# Patient Record
Sex: FEMALE | Race: WHITE | NOT HISPANIC OR LATINO | Employment: FULL TIME | URBAN - NONMETROPOLITAN AREA
[De-identification: names, ages, dates, MRNs, and addresses within clinical notes are randomized per-mention and may not be internally consistent; named-entity substitution may affect disease eponyms.]

---

## 2018-05-22 ENCOUNTER — HOSPITAL ENCOUNTER (EMERGENCY)
Facility: HOSPITAL | Age: 28
Discharge: HOME | End: 2018-05-22
Attending: STUDENT IN AN ORGANIZED HEALTH CARE EDUCATION/TRAINING PROGRAM | Admitting: STUDENT IN AN ORGANIZED HEALTH CARE EDUCATION/TRAINING PROGRAM

## 2018-05-22 VITALS
SYSTOLIC BLOOD PRESSURE: 120 MMHG | BODY MASS INDEX: 41.44 KG/M2 | WEIGHT: 225.2 LBS | DIASTOLIC BLOOD PRESSURE: 79 MMHG | HEART RATE: 79 BPM | TEMPERATURE: 98.1 F | OXYGEN SATURATION: 99 % | RESPIRATION RATE: 19 BRPM | HEIGHT: 62 IN

## 2018-05-22 DIAGNOSIS — M54.50 ACUTE BILATERAL LOW BACK PAIN WITHOUT SCIATICA: ICD-10-CM

## 2018-05-22 DIAGNOSIS — S39.012A LUMBOSACRAL STRAIN, INITIAL ENCOUNTER: Primary | ICD-10-CM

## 2018-05-22 LAB
B-HCG UR QL: NEGATIVE
BACTERIA UR QL AUTO: ABNORMAL /HPF
BILIRUB UR QL STRIP: NEGATIVE
CLARITY UR: ABNORMAL
COLOR UR: YELLOW
GLUCOSE UR STRIP-MCNC: NEGATIVE MG/DL
HGB UR QL STRIP.AUTO: ABNORMAL
HYALINE CASTS UR QL AUTO: ABNORMAL /LPF
KETONES UR QL STRIP: NEGATIVE
LEUKOCYTE ESTERASE UR QL STRIP.AUTO: ABNORMAL
NITRITE UR QL STRIP: NEGATIVE
PH UR STRIP.AUTO: 5.5 [PH] (ref 5–8)
PROT UR QL STRIP: NEGATIVE
RBC # UR: ABNORMAL /HPF
REF LAB TEST METHOD: ABNORMAL
SP GR UR STRIP: 1.02 (ref 1–1.03)
SQUAMOUS #/AREA URNS HPF: ABNORMAL /HPF
UROBILINOGEN UR QL STRIP: ABNORMAL
WBC UR QL AUTO: ABNORMAL /HPF

## 2018-05-22 PROCEDURE — 81001 URINALYSIS AUTO W/SCOPE: CPT | Performed by: PHYSICIAN ASSISTANT

## 2018-05-22 PROCEDURE — 81025 URINE PREGNANCY TEST: CPT | Performed by: PHYSICIAN ASSISTANT

## 2018-05-22 PROCEDURE — 99283 EMERGENCY DEPT VISIT LOW MDM: CPT

## 2018-05-22 RX ORDER — ORPHENADRINE CITRATE 100 MG/1
100 TABLET, EXTENDED RELEASE ORAL 2 TIMES DAILY PRN
Qty: 14 TABLET | Refills: 0 | Status: SHIPPED | OUTPATIENT
Start: 2018-05-22 | End: 2018-12-05

## 2018-05-22 RX ORDER — NAPROXEN 500 MG/1
500 TABLET ORAL 2 TIMES DAILY PRN
Qty: 10 TABLET | Refills: 0 | Status: SHIPPED | OUTPATIENT
Start: 2018-05-22 | End: 2018-12-05

## 2018-05-22 NOTE — ED PROVIDER NOTES
Subjective   28-year-old female presents to emergency department with low back pain since yesterday.  States she has been moving recently more lifting and physical activity than usual.  Yesterday pain began increasing with throbbing and spasms in the low back LS/SI area.  No radiation into the buttocks or lower extremities.  No bowel or bladder dysfunction.  No abdominal pain bloating or distention.  No fevers chills or sweats dysuria hematuria pyuria.  No prior history of chronic back pain or chronic back problems.        History provided by:  Patient  Back Pain   Location:  Lumbar spine and sacro-iliac joint  Quality:  Aching and burning  Radiates to:  Does not radiate  Pain severity:  Moderate  Pain is:  Same all the time  Onset quality:  Gradual  Duration:  2 days  Timing:  Constant  Progression:  Waxing and waning  Chronicity:  New  Context: lifting heavy objects and physical stress    Context: not recent illness, not recent injury and not twisting    Relieved by:  OTC medications, bed rest and heating pad  Worsened by:  Ambulation, bending and movement  Ineffective treatments:  Heating pad, lying down and OTC medications  Associated symptoms: no abdominal pain, no abdominal swelling, no bladder incontinence, no bowel incontinence, no chest pain, no dysuria, no fever, no headaches, no leg pain, no numbness, no paresthesias, no pelvic pain, no perianal numbness, no tingling and no weakness        Review of Systems   Constitutional: Negative for chills and fever.   Respiratory: Negative for shortness of breath.    Cardiovascular: Negative for chest pain.   Gastrointestinal: Negative for abdominal pain, anal bleeding, blood in stool, bowel incontinence, constipation, diarrhea, nausea and vomiting.   Genitourinary: Negative for bladder incontinence, difficulty urinating, dysuria, flank pain, frequency, hematuria, pelvic pain and urgency.   Musculoskeletal: Positive for back pain. Negative for neck stiffness.    Neurological: Negative for dizziness, tingling, seizures, syncope, speech difficulty, weakness, light-headedness, numbness, headaches and paresthesias.   Psychiatric/Behavioral: Negative for confusion.   All other systems reviewed and are negative.      History reviewed. No pertinent past medical history.    No Known Allergies    Past Surgical History:   Procedure Laterality Date   • TONSILLECTOMY     • WISDOM TOOTH EXTRACTION         History reviewed. No pertinent family history.    Social History     Social History   • Marital status: Single     Social History Main Topics   • Smoking status: Never Smoker   • Smokeless tobacco: Never Used   • Alcohol use No   • Drug use: No     Other Topics Concern   • Not on file           Objective   Physical Exam   Constitutional: She is oriented to person, place, and time. She appears well-developed and well-nourished. No distress.   HENT:   Head: Normocephalic and atraumatic.   Right Ear: External ear normal.   Left Ear: External ear normal.   Nose: Nose normal.   Mouth/Throat: Oropharynx is clear and moist. No oropharyngeal exudate.   Eyes: Conjunctivae and EOM are normal. Pupils are equal, round, and reactive to light. Right eye exhibits no discharge. Left eye exhibits no discharge. No scleral icterus.   Neck: Normal range of motion. Neck supple. No JVD present. No tracheal deviation present. No thyromegaly present.   Cardiovascular: Normal rate, regular rhythm, normal heart sounds and intact distal pulses.  Exam reveals no gallop and no friction rub.    No murmur heard.  Pulmonary/Chest: Effort normal. No stridor. No respiratory distress. She has no wheezes. She has no rales.   Abdominal: Soft. She exhibits no distension. There is no tenderness. There is no guarding.   Musculoskeletal: Normal range of motion. She exhibits tenderness. She exhibits no edema or deformity.   Tenderness bilateral SI joints and lower lumbosacral musculature bilaterally.  No true midline  tenderness.  Straight leg raises are negative.  DTRs are 1-2+ over 4+ at the patellae.     Neurological: She is alert and oriented to person, place, and time. No cranial nerve deficit. She exhibits normal muscle tone. Coordination normal.   Skin: Skin is warm and dry. No rash noted. She is not diaphoretic. No erythema. No pallor.   Psychiatric: She has a normal mood and affect. Her behavior is normal. Judgment and thought content normal.   Nursing note and vitals reviewed.      Procedures        Recent Results (from the past 24 hour(s))   Urinalysis With / Culture If Indicated - Urine, Clean Catch    Collection Time: 05/22/18 12:47 PM   Result Value Ref Range    Color, UA Yellow Yellow, Straw    Appearance, UA Slightly Cloudy (A) Clear    pH, UA 5.5 5.0 - 8.0    Specific Gravity, UA 1.020 1.005 - 1.030    Glucose, UA Negative Negative    Ketones, UA Negative Negative    Bilirubin, UA Negative Negative    Blood, UA Small (1+) (A) Negative    Protein, UA Negative Negative    Leuk Esterase, UA Trace (A) Negative    Nitrite, UA Negative Negative    Urobilinogen, UA 0.2 E.U./dL 0.2 - 1.0 E.U./dL   Pregnancy, Urine - Urine, Clean Catch    Collection Time: 05/22/18 12:47 PM   Result Value Ref Range    HCG, Urine QL Negative Negative   Urinalysis, Microscopic Only - Urine, Clean Catch    Collection Time: 05/22/18 12:47 PM   Result Value Ref Range    RBC, UA 0-2 (A) None Seen /HPF    WBC, UA 3-5 (A) None Seen /HPF    Bacteria, UA 1+ (A) None Seen /HPF    Squamous Epithelial Cells, UA 7-12 (A) None Seen, 0-2 /HPF    Hyaline Casts, UA None Seen None Seen /LPF    Methodology Manual Light Microscopy      Note: In addition to lab results from this visit, the labs listed above may include labs taken at another facility or during a different encounter within the last 24 hours. Please correlate lab times with ED admission and discharge times for further clarification of the services performed during this visit.    No orders to  "display     Vitals:    05/22/18 1215   BP: 125/85   BP Location: Right arm   Patient Position: Sitting   Pulse: 94   Resp: 18   Temp: 97.6 °F (36.4 °C)   TempSrc: Oral   SpO2: 98%   Weight: 102 kg (225 lb 3.2 oz)   Height: 157.5 cm (62\")     Medications - No data to display  ECG/EMG Results (last 24 hours)     ** No results found for the last 24 hours. **              ED Course                  MDM      Final diagnoses:   Lumbosacral strain, initial encounter   Acute bilateral low back pain without sciatica            Jason Swartz PA-C  05/22/18 4777    "

## 2018-05-22 NOTE — DISCHARGE INSTRUCTIONS
Rest, use warm compresses sparingly 2-3 times a day for 5-10 minutes.  Follow-up with one of the providers listed below to establish a primary care provider and recheck later this week.  Return to the emergency department immediately if any change or worsening of symptoms.

## 2020-03-14 ENCOUNTER — APPOINTMENT (OUTPATIENT)
Dept: GENERAL RADIOLOGY | Facility: HOSPITAL | Age: 30
End: 2020-03-14

## 2020-03-14 ENCOUNTER — HOSPITAL ENCOUNTER (EMERGENCY)
Facility: HOSPITAL | Age: 30
Discharge: HOME | End: 2020-03-14
Attending: EMERGENCY MEDICINE | Admitting: EMERGENCY MEDICINE

## 2020-03-14 VITALS
OXYGEN SATURATION: 98 % | BODY MASS INDEX: 40.48 KG/M2 | HEART RATE: 109 BPM | TEMPERATURE: 101.5 F | RESPIRATION RATE: 16 BRPM | WEIGHT: 220 LBS | SYSTOLIC BLOOD PRESSURE: 110 MMHG | HEIGHT: 62 IN | DIASTOLIC BLOOD PRESSURE: 70 MMHG

## 2020-03-14 DIAGNOSIS — J10.1 INFLUENZA B: Primary | ICD-10-CM

## 2020-03-14 LAB
FLUAV AG NPH QL: NEGATIVE
FLUBV AG NPH QL IA: POSITIVE

## 2020-03-14 PROCEDURE — 71046 X-RAY EXAM CHEST 2 VIEWS: CPT

## 2020-03-14 PROCEDURE — 87804 INFLUENZA ASSAY W/OPTIC: CPT | Performed by: PHYSICIAN ASSISTANT

## 2020-03-14 PROCEDURE — 99283 EMERGENCY DEPT VISIT LOW MDM: CPT

## 2020-03-14 RX ORDER — ACETAMINOPHEN 325 MG/1
650 TABLET ORAL ONCE
Status: COMPLETED | OUTPATIENT
Start: 2020-03-14 | End: 2020-03-14

## 2020-03-14 RX ORDER — ONDANSETRON 4 MG/1
4 TABLET, ORALLY DISINTEGRATING ORAL EVERY 8 HOURS PRN
Qty: 12 TABLET | Refills: 0 | Status: SHIPPED | OUTPATIENT
Start: 2020-03-14 | End: 2021-11-03

## 2020-03-14 RX ADMIN — ACETAMINOPHEN 650 MG: 325 TABLET, FILM COATED ORAL at 20:07

## 2020-03-14 NOTE — ED PROVIDER NOTES
Subjective   30-year-old female presents with a fever and sore throat, she is also had a cough and congestion.  She was recently diagnosed with strep several days ago, and is currently on amoxicillin.  Continues to have breakthrough fever while on amoxicillin.      History provided by:  Patient   used: No        Review of Systems   Constitutional: Positive for fever.   HENT: Positive for congestion and sore throat.    Respiratory: Positive for cough.    All other systems reviewed and are negative.      History reviewed. No pertinent past medical history.    No Known Allergies    Past Surgical History:   Procedure Laterality Date   • TONSILLECTOMY     • WISDOM TOOTH EXTRACTION         Family History   Problem Relation Age of Onset   • No Known Problems Mother    • No Known Problems Father        Social History     Socioeconomic History   • Marital status: Single     Spouse name: Not on file   • Number of children: Not on file   • Years of education: Not on file   • Highest education level: Not on file   Tobacco Use   • Smoking status: Never Smoker   • Smokeless tobacco: Never Used   Substance and Sexual Activity   • Alcohol use: No   • Drug use: No           Objective   Physical Exam   Constitutional: She is oriented to person, place, and time. She appears well-developed and well-nourished.   HENT:   Head: Normocephalic and atraumatic.   Eyes: EOM are normal.   Neck: Normal range of motion. Neck supple.   Cardiovascular: Normal rate and regular rhythm.   Pulmonary/Chest: Effort normal and breath sounds normal.   Abdominal: Soft. Bowel sounds are normal.   Musculoskeletal: Normal range of motion.   Neurological: She is alert and oriented to person, place, and time. She has normal reflexes.   Skin: Skin is warm and dry.   Psychiatric: She has a normal mood and affect.   Nursing note and vitals reviewed.      Procedures           ED Course                                           MDM  Number of  Diagnoses or Management Options  Influenza B: new and requires workup     Amount and/or Complexity of Data Reviewed  Clinical lab tests: reviewed  Tests in the radiology section of CPT®: reviewed  Independent visualization of images, tracings, or specimens: yes    Risk of Complications, Morbidity, and/or Mortality  Presenting problems: minimal  Diagnostic procedures: minimal  Management options: minimal    Patient Progress  Patient progress: stable      Final diagnoses:   Influenza B            Osvaldo Yin Jr., JOSSY  03/14/20 2030

## 2021-11-03 ENCOUNTER — OFFICE VISIT (OUTPATIENT)
Dept: PSYCHIATRY | Facility: CLINIC | Age: 31
End: 2021-11-03

## 2021-11-03 VITALS
SYSTOLIC BLOOD PRESSURE: 130 MMHG | HEIGHT: 62 IN | BODY MASS INDEX: 46.38 KG/M2 | DIASTOLIC BLOOD PRESSURE: 78 MMHG | WEIGHT: 252 LBS | HEART RATE: 70 BPM

## 2021-11-03 DIAGNOSIS — F41.1 GAD (GENERALIZED ANXIETY DISORDER): ICD-10-CM

## 2021-11-03 DIAGNOSIS — F32.4 MAJOR DEPRESSIVE DISORDER WITH SINGLE EPISODE, IN PARTIAL REMISSION (HCC): Primary | ICD-10-CM

## 2021-11-03 DIAGNOSIS — R41.840 POOR CONCENTRATION: ICD-10-CM

## 2021-11-03 PROCEDURE — 90792 PSYCH DIAG EVAL W/MED SRVCS: CPT | Performed by: NURSE PRACTITIONER

## 2021-11-03 RX ORDER — HYDROXYZINE HYDROCHLORIDE 25 MG/1
TABLET, FILM COATED ORAL
COMMUNITY
Start: 2021-10-12 | End: 2021-12-01

## 2021-11-03 RX ORDER — BUSPIRONE HYDROCHLORIDE 7.5 MG/1
TABLET ORAL
COMMUNITY
Start: 2021-09-08 | End: 2022-01-05 | Stop reason: SDUPTHER

## 2021-11-03 RX ORDER — SERTRALINE HYDROCHLORIDE 100 MG/1
TABLET, FILM COATED ORAL
COMMUNITY
Start: 2021-10-12 | End: 2022-01-05 | Stop reason: SDUPTHER

## 2021-11-03 RX ORDER — MULTIPLE VITAMINS W/ MINERALS TAB 9MG-400MCG
TAB ORAL
COMMUNITY
End: 2022-07-12

## 2021-11-03 RX ORDER — PROPRANOLOL HYDROCHLORIDE 10 MG/1
TABLET ORAL
COMMUNITY
Start: 2021-09-22 | End: 2022-01-05 | Stop reason: SDUPTHER
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
## 2021-11-09 ENCOUNTER — TELEPHONE (OUTPATIENT)
Dept: PSYCHIATRY | Facility: CLINIC | Age: 31
End: 2021-11-09

## 2021-11-09 NOTE — TELEPHONE ENCOUNTER
I reviewed her results and there is a moderate likelihood of having a disorder characterized by symptoms of inattentiveness. She has a psychiatric NP so she wanted to discuss medication options upon her return in 4 weeks.

## 2021-11-09 NOTE — TELEPHONE ENCOUNTER
SARAH CALLED TO LET YOU KNOW THAT SHE WENT TO [place] AND COMPLETED HER CPT TEST. LOOKS LIKE IT WAS SCANNED INTO MEDIA TODAY.

## 2021-11-11 ENCOUNTER — TELEPHONE (OUTPATIENT)
Dept: PSYCHIATRY | Facility: CLINIC | Age: 31
End: 2021-11-11

## 2021-11-11 NOTE — TELEPHONE ENCOUNTER
Jeanne called in to advise you that she has completed the CPT testing in [place], she wanted to know results, or move her apt up if possible. Please advise.

## 2021-12-01 ENCOUNTER — LAB (OUTPATIENT)
Dept: LAB | Facility: HOSPITAL | Age: 31
End: 2021-12-01

## 2021-12-01 ENCOUNTER — OFFICE VISIT (OUTPATIENT)
Dept: PSYCHIATRY | Facility: CLINIC | Age: 31
End: 2021-12-01

## 2021-12-01 VITALS
BODY MASS INDEX: 46.38 KG/M2 | HEART RATE: 80 BPM | SYSTOLIC BLOOD PRESSURE: 130 MMHG | WEIGHT: 252 LBS | HEIGHT: 62 IN | DIASTOLIC BLOOD PRESSURE: 74 MMHG

## 2021-12-01 DIAGNOSIS — F90.0 ADHD (ATTENTION DEFICIT HYPERACTIVITY DISORDER), INATTENTIVE TYPE: Primary | ICD-10-CM

## 2021-12-01 DIAGNOSIS — G47.09 OTHER INSOMNIA: Primary | ICD-10-CM

## 2021-12-01 DIAGNOSIS — Z79.899 MEDICATION MANAGEMENT: ICD-10-CM

## 2021-12-01 LAB
AMPHET+METHAMPHET UR QL: NEGATIVE
AMPHETAMINES UR QL: NEGATIVE
BARBITURATES UR QL SCN: NEGATIVE
BENZODIAZ UR QL SCN: NEGATIVE
BUPRENORPHINE SERPL-MCNC: NEGATIVE NG/ML
CANNABINOIDS SERPL QL: POSITIVE
COCAINE UR QL: NEGATIVE
METHADONE UR QL SCN: NEGATIVE
OPIATES UR QL: NEGATIVE
OXYCODONE UR QL SCN: NEGATIVE
PCP UR QL SCN: NEGATIVE
PROPOXYPH UR QL: NEGATIVE
TRICYCLICS UR QL SCN: NEGATIVE

## 2021-12-01 PROCEDURE — 99214 OFFICE O/P EST MOD 30 MIN: CPT | Performed by: NURSE PRACTITIONER

## 2021-12-01 PROCEDURE — G0480 DRUG TEST DEF 1-7 CLASSES: HCPCS

## 2021-12-01 PROCEDURE — 80306 DRUG TEST PRSMV INSTRMNT: CPT

## 2021-12-01 RX ORDER — DEXTROAMPHETAMINE SACCHARATE, AMPHETAMINE ASPARTATE, DEXTROAMPHETAMINE SULFATE AND AMPHETAMINE SULFATE 2.5; 2.5; 2.5; 2.5 MG/1; MG/1; MG/1; MG/1
TABLET ORAL
Qty: 49 TABLET | Refills: 0 | Status: SHIPPED | OUTPATIENT
Start: 2021-12-01 | End: 2021-12-29

## 2021-12-01 RX ORDER — TRAZODONE HYDROCHLORIDE 50 MG/1
50 TABLET ORAL NIGHTLY
Qty: 30 TABLET | Refills: 2 | Status: SHIPPED | OUTPATIENT
Start: 2021-12-01 | End: 2022-01-05

## 2021-12-01 NOTE — PROGRESS NOTES
"Chief Complaint  Poor concentration, depression, and anxiety      Subjective          Jeanne Jack presents to [facility] by herself for a follow up and medication check.    History of Present Illness: Jeanne states, \"I am good.\" Jeanne tells me that \"nothing has changed\" since her last visit. She is working and going to school. Her children are doing well. She completed a CPT-3 test from the [facility] in [place], [place] and is here to discuss results. She continues to struggle with symptoms of focus and concentration.  She reports being compliant with her psychiatric medications and endorses moderate symptoms of depression such as anhedonia, difficulty with sleep, fatigue, increased appetite, feelings of guilt, decreased concentration, and psychomotor retardation.  She adamantly denies any intent or plan for suicide.  She also reports symptoms of anxiety such as feeling nervous, difficulty controlling her worry, worrying too much about different things, difficulty relaxing, being restless, and being easily annoyed and irritable.  She tells me it takes about an hour for her to fall asleep nightly and is up between 2 and 3 times.  She is reports snoring.  She is taking BuSpar, hydroxyzine, propranolol, and Zoloft.  She denies any side effects from current medication regiment.  She denies any problems with SI/HI/AVH.    Current Medications:   Current Outpatient Medications   Medication Sig Dispense Refill   • busPIRone (BUSPAR) 7.5 MG tablet      • multivitamin with minerals (MULTIVITAMIN ADULT PO) multivitamin     • propranolol (INDERAL) 10 MG tablet      • sertraline (ZOLOFT) 100 MG tablet      • amphetamine-dextroamphetamine (Adderall) 10 MG tablet Take 1 tablet by mouth Daily for 7 days, THEN 1 tablet 2 (Two) Times a Day for 21 days. 49 tablet 0   • traZODone (DESYREL) 50 MG tablet Take 1 tablet by mouth Every Night. 30 tablet 2     No current facility-administered " "medications for this visit.         Objective   Vital Signs:   /74   Pulse 80   Ht 157.5 cm (62\")   Wt 114 kg (252 lb)   BMI 46.09 kg/m²     Physical Exam  Vitals and nursing note reviewed.   Constitutional:       Appearance: Normal appearance. She is well-developed.   Musculoskeletal:         General: Normal range of motion.   Skin:     General: Skin is warm and dry.   Neurological:      Mental Status: She is alert and oriented to person, place, and time.   Psychiatric:         Attention and Perception: Attention normal.         Mood and Affect: Mood normal.         Speech: Speech normal.         Behavior: Behavior normal. Behavior is cooperative.         Thought Content: Thought content normal.         Cognition and Memory: Cognition normal.         Judgment: Judgment normal.        Result Review :     The following data was reviewed by: ABILIO Dixon on 12/01/2021:    BEHAVIORAL HEALTH - SCAN - [facility] (10/19/2021)    BEHAVIORAL HEALTH - SCAN - [facility] (11/09/2021)       Assessment and Plan    Problem List Items Addressed This Visit     None      Visit Diagnoses     Other insomnia    -  Primary    Relevant Medications    traZODone (DESYREL) 50 MG tablet    Medication management        Relevant Orders    Urine Drug Screen - Urine, Clean Catch (Completed)          Mental Status Exam:   Hygiene:   good  Cooperation:  Cooperative  Eye Contact:  Good  Psychomotor Behavior:  Appropriate  Affect:  Appropriate  Mood: normal  Speech:  Normal  Thought Process:  Goal directed and Linear  Thought Content:  Normal  Suicidal:  None  Homicidal:  None  Hallucinations:  None  Delusion:  None  Memory:  Intact  Orientation:  Person, Place, Time and Situation  Reliability:  good  Insight:  Fair  Judgement:  Fair  Impulse Control:  Fair  Physical/Medical Issues:  Yes Obesity per BMI      PHQ-9 Score:   PHQ-9 Total Score: 14    Impression/Plan:  -This is an initial evaluation of the patient. Jeanne reports " "ongoing symptoms of depression and anxiety.  She feels nothing has changed since her last appointment.  She obtained a CPT 3 test from the [facility].  It indicates a moderate likelihood of having a disorder characterized by symptoms of inattentiveness.  Jeanne would like to pursue ADHD treatment as she is struggling at both work and home.  We discussed potential options including controlled substances such as Adderall versus Ritalin.  Her son takes Adderall and she feels this may be appropriate for her as well.  Jeanne signed a controlled substance agreement and submitted a urine drug screen.  She was positive for THC and I had a conversation with Jeanne about marijuana use.  She reports using this \"once in a while.\"  She understands that she must abstain from all illicit substances in order to have a controlled substance.  We also discussed adding trazodone to her regiment for help with sleep.  I encouraged Jeanne to have a sleep study due to history of poor sleep and snoring.  She agrees to this.  -Initiate trazodone 50 mg nightly for insomnia.  I explained the purpose of this medication to Jeanne.  We discussed the risk versus benefits of adding this medication to her regiment, as well as potential side effects.  She verbalized understanding.  -Initiate Adderall 10 mg daily for 1 week then increase to 10 mg twice daily for ADHD symptoms.  I explained the purpose of this medication to Jeanne.  We discussed the risk versus benefits of adding this medication to her regiment, as well as potential side effects.  She verbalized understanding.  -The patient is being prescribed a controlled substance as part of the treatment plan. The patient/guardian has been educated of appropriate use of the medication(s), including risks and side effects such as insomnia, headache, exacerbation of tics, nervousness, irritability, overstimulation, tremor, dizziness, anorexia or change in appetite, nausea, dry mouth, constipation, " diarrhea, weight loss, sexual dysfunction, psychotic episodes, seizures, palpitations, tachycardia, hypertension, rare activation (activation of hypomania, mouna, and/or suicidal ideations), cardiovascular adverse effects including sudden death (especially patients with pre-existing structural abnormalities often associated with a family history of cardiac disease), may slow normal growth in children, weight gain is reported but not expected, sedation is possible but activation is much more common, metabolic adversities, and overdose among others. Patient/guardian is also informed that the medication is to be used by the patient only, the medication is to be used only as directed, and the medication should not be combined with other substances unless directed by a Provider/Prescriber. The patient/guardian verbalized understanding and agreement with this in their own words, and wish to continue with current treatment plan.  -Continue Zoloft 2 mg nightly for depression and anxiety.  -Continue propranolol 10 mg twice times daily as needed for anxiety.   -Continue BuSpar 7.5 mg twice daily for anxiety. Patient has refills.  -Continue hydroxyzine 25 mg 3 times daily as needed for anxiety. Patient has refills.  -I will collect a urine drug screen and review the official results.  I am expecting her to be negative for all substances.  -The patient has read and signed the [facility] Controlled Substance Contract. We discussed the risks and benefits of the use of controlled substances, including the risk of tolerance and drug dependence. Anorectic medications can be prescribed by one provider at a time and dispensed from one facility at a time, they can only be taken as prescribed, and we are not obligated to refill them if lost or stolen. The refills are only during regular clinic hours. Jonathan report was pulled on patient, reviewed and found to be appropriate.   -Consult neurology for sleep study.    MEDS ORDERED  DURING VISIT:  New Medications Ordered This Visit   Medications   • traZODone (DESYREL) 50 MG tablet     Sig: Take 1 tablet by mouth Every Night.     Dispense:  30 tablet     Refill:  2       I spent 30 minutes caring for Jeanne on this date of service. This time includes time spent by me in the following activities:preparing for the visit, reviewing tests, performing a medically appropriate examination and/or evaluation , counseling and educating the patient/family/caregiver, ordering medications, tests, or procedures, documenting information in the medical record and care coordination  Follow Up   Return in about 4 weeks (around 12/29/2021) for Medication Check.  Patient was given instructions and counseling regarding her condition or for health maintenance advice. Please see specific information pulled into the AVS if appropriate.       TREATMENT PLAN/GOALS: Continue supportive psychotherapy efforts and medications as indicated. Treatment and medication options discussed during today's visit. Patient acknowledged and verbally consented to continue with current treatment plan and was educated on the importance of compliance with treatment and follow-up appointments.    MEDICATION ISSUES:  Discussed medication options and treatment plan of prescribed medication as well as the risks, benefits, and side effects including potential falls, possible impaired driving and metabolic adversities among others. Patient is agreeable to call the office with any worsening of symptoms or onset of side effects. Patient is agreeable to call 911 or go to the nearest ER should he/she begin having SI/HI.        This document has been electronically signed by ABILIO Ocasio, PMHNP-BC  December 1, 2021 16:40 EST    Part of this note may be an electronic transcription/translation of spoken language to printed text using the Dragon Dictation System.

## 2021-12-15 LAB — CANNABINOIDS UR QL CFM: POSITIVE

## 2022-01-05 ENCOUNTER — OFFICE VISIT (OUTPATIENT)
Dept: PSYCHIATRY | Facility: CLINIC | Age: 32
End: 2022-01-05

## 2022-01-05 VITALS
HEIGHT: 62 IN | SYSTOLIC BLOOD PRESSURE: 122 MMHG | WEIGHT: 256 LBS | BODY MASS INDEX: 47.11 KG/M2 | DIASTOLIC BLOOD PRESSURE: 86 MMHG | HEART RATE: 96 BPM

## 2022-01-05 DIAGNOSIS — F41.1 GAD (GENERALIZED ANXIETY DISORDER): ICD-10-CM

## 2022-01-05 DIAGNOSIS — G47.09 OTHER INSOMNIA: ICD-10-CM

## 2022-01-05 DIAGNOSIS — F32.4 MAJOR DEPRESSIVE DISORDER WITH SINGLE EPISODE, IN PARTIAL REMISSION: ICD-10-CM

## 2022-01-05 DIAGNOSIS — F90.0 ADHD (ATTENTION DEFICIT HYPERACTIVITY DISORDER), INATTENTIVE TYPE: Primary | ICD-10-CM

## 2022-01-05 PROCEDURE — 99213 OFFICE O/P EST LOW 20 MIN: CPT | Performed by: NURSE PRACTITIONER

## 2022-01-05 RX ORDER — DEXTROAMPHETAMINE SACCHARATE, AMPHETAMINE ASPARTATE, DEXTROAMPHETAMINE SULFATE AND AMPHETAMINE SULFATE 2.5; 2.5; 2.5; 2.5 MG/1; MG/1; MG/1; MG/1
10 TABLET ORAL 2 TIMES DAILY
COMMUNITY
End: 2022-01-05

## 2022-01-05 RX ORDER — SERTRALINE HYDROCHLORIDE 100 MG/1
100 TABLET, FILM COATED ORAL NIGHTLY
Qty: 30 TABLET | Refills: 2 | Status: SHIPPED | OUTPATIENT
Start: 2022-01-05 | End: 2022-03-08

## 2022-01-05 RX ORDER — PROPRANOLOL HYDROCHLORIDE 10 MG/1
10 TABLET ORAL 2 TIMES DAILY PRN
Qty: 60 TABLET | Refills: 2 | Status: SHIPPED | OUTPATIENT
Start: 2022-01-05 | End: 2022-05-09 | Stop reason: SDUPTHER

## 2022-01-05 RX ORDER — DEXTROAMPHETAMINE SACCHARATE, AMPHETAMINE ASPARTATE MONOHYDRATE, DEXTROAMPHETAMINE SULFATE AND AMPHETAMINE SULFATE 3.75; 3.75; 3.75; 3.75 MG/1; MG/1; MG/1; MG/1
15 CAPSULE, EXTENDED RELEASE ORAL EVERY MORNING
Qty: 30 CAPSULE | Refills: 0 | Status: SHIPPED | OUTPATIENT
Start: 2022-01-05 | End: 2022-03-08 | Stop reason: SDUPTHER

## 2022-01-05 RX ORDER — BUSPIRONE HYDROCHLORIDE 7.5 MG/1
7.5 TABLET ORAL 2 TIMES DAILY
Qty: 60 TABLET | Refills: 2 | Status: SHIPPED | OUTPATIENT
Start: 2022-01-05 | End: 2022-05-09 | Stop reason: SDUPTHER

## 2022-01-05 RX ORDER — TRAZODONE HYDROCHLORIDE 100 MG/1
100 TABLET ORAL NIGHTLY
Qty: 30 TABLET | Refills: 1 | Status: SHIPPED | OUTPATIENT
Start: 2022-01-05 | End: 2022-03-08 | Stop reason: SDUPTHER

## 2022-01-05 NOTE — PROGRESS NOTES
"Chief Complaint  Depression, anxiety, and ADHD      Subjective          Jeanne Jack presents to [facility] by herself for a follow up and medication check.    History of Present Illness: Jeanne states, \"I am good.\" Jeanne tells me that Trazodone has not been beneficial for her sleep.  She tells me that she takes her medicine around 10 PM nightly and is still taking 45 minutes to 1 hour to initiate sleep.  She tells me that she wakes to go to the bathroom or at random times and it is difficult to resume sleep.  She is up for good around 6:30 AM.  She reports mild depressive symptoms such as anhedonia, difficulty with sleep, fatigue, increased appetite after her medications wear off, decreased concentration, and psychomotor retardation.  She reports having ongoing restlessness and difficulty relaxing.  She tells me the Adderall has been beneficial to help her focus and she is less distractible.  She also reports having more energy during the day.  She has noticed a \"crash\" in the evening with this medication.  She is no longer using marijuana.  She is compliant with her current medication regiment.  She is taking BuSpar, propranolol, Zoloft, trazodone, and Adderall.    Current Medications:   Current Outpatient Medications   Medication Sig Dispense Refill   • busPIRone (BUSPAR) 7.5 MG tablet Take 1 tablet by mouth 2 (Two) Times a Day. 60 tablet 2   • multivitamin with minerals (MULTIVITAMIN ADULT PO) multivitamin     • propranolol (INDERAL) 10 MG tablet Take 1 tablet by mouth 2 (Two) Times a Day As Needed (anxiety). 60 tablet 2   • sertraline (ZOLOFT) 100 MG tablet Take 1 tablet by mouth Every Night. 30 tablet 2   • traZODone (DESYREL) 100 MG tablet Take 1 tablet by mouth Every Night. 30 tablet 1   • amphetamine-dextroamphetamine XR (Adderall XR) 15 MG 24 hr capsule Take 1 capsule by mouth Every Morning 30 capsule 0     No current facility-administered medications for this visit. " "        Objective   Vital Signs:   /86   Pulse 96   Ht 157.5 cm (62\")   Wt 116 kg (256 lb)   BMI 46.82 kg/m²     Physical Exam  Vitals and nursing note reviewed.   Constitutional:       Appearance: Normal appearance. She is well-developed. She is obese.   Musculoskeletal:         General: Normal range of motion.   Skin:     General: Skin is warm and dry.   Neurological:      Mental Status: She is alert and oriented to person, place, and time.   Psychiatric:         Attention and Perception: Attention normal.         Mood and Affect: Mood normal.         Speech: Speech normal.         Behavior: Behavior normal. Behavior is cooperative.         Thought Content: Thought content normal.         Cognition and Memory: Cognition normal.         Judgment: Judgment normal.        Result Review :                   Assessment and Plan    Problem List Items Addressed This Visit     None      Visit Diagnoses     ADHD (attention deficit hyperactivity disorder), inattentive type    -  Primary    Relevant Medications    traZODone (DESYREL) 100 MG tablet    busPIRone (BUSPAR) 7.5 MG tablet    sertraline (ZOLOFT) 100 MG tablet    amphetamine-dextroamphetamine XR (Adderall XR) 15 MG 24 hr capsule    Other insomnia        Relevant Medications    traZODone (DESYREL) 100 MG tablet    Major depressive disorder with single episode, in partial remission (HCC)        Relevant Medications    traZODone (DESYREL) 100 MG tablet    busPIRone (BUSPAR) 7.5 MG tablet    sertraline (ZOLOFT) 100 MG tablet    amphetamine-dextroamphetamine XR (Adderall XR) 15 MG 24 hr capsule    ERA (generalized anxiety disorder)        Relevant Medications    traZODone (DESYREL) 100 MG tablet    busPIRone (BUSPAR) 7.5 MG tablet    sertraline (ZOLOFT) 100 MG tablet    propranolol (INDERAL) 10 MG tablet    amphetamine-dextroamphetamine XR (Adderall XR) 15 MG 24 hr capsule          Mental Status Exam:   Hygiene:   good  Cooperation:  Cooperative  Eye Contact:  " "Good  Psychomotor Behavior:  Appropriate  Affect:  Appropriate  Mood: normal  Speech:  Normal  Thought Process:  Goal directed and Linear  Thought Content:  Normal  Suicidal:  None  Homicidal:  None  Hallucinations:  None  Delusion:  None  Memory:  Intact  Orientation:  Person, Place, Time and Situation  Reliability:  good  Insight:  Good  Judgement:  Good  Impulse Control:  Good  Physical/Medical Issues:  Yes Obesity per BMI       PHQ-9 Score:   PHQ-9 Total Score: 9    Impression/Plan:  -This is a follow up and medication check.  Jeanne reports improved symptoms of depression and anxiety.  She also reports improved focus, concentration, and attention.  She does have a notable \"crash\" when her medication wears off in the evening which has increased her appetite.  She reports having some ongoing restlessness and difficulty relaxing.  She does not feel the trazodone has been very beneficial for her sleep.  We discussed continuing to make medication adjustments to improve her symptoms including increasing trazodone to assess for further benefits and changing Adderall to XR.  She is in agreement to these changes.  -Increase trazodone to 100 mg nightly for insomnia.    -Change Adderall to XR 15 mg daily for ADHD symptoms.    The patient is aware she can contact this provider if the 15 mg dose is not as beneficial for her symptom management.  At that time, we will add a 5 mg XR dose to take in addition to the 15 mg.  -Continue BuSpar 7.5 mg twice daily for anxiety.  -Continue propranolol 10 mg twice daily as needed for anxiety.  -Continue Zoloft 100 mg nightly for depression and anxiety.  -The [facility] report, reviewed through PDMP, of the past 12 months were reviewed and is appropriate.  The patient/guardian reports taking the medication only as prescribed.  The patient/guardian denies any abuse or misuse of the medication.  The patient/guardian denies any other substance use or issues.  There are no apparent substance " related issues.  The patient reports no side effects of the current medication usage.  The patient/guardian has reported significant improvement with medication usage and wishes to continue medication as prescribed.  The patient/guardian is appropriate to continue with current medication usage at this time.  Reinforced risks and side effects of medication usage, patient and/or guardian verbalize understanding in their own words and are in agreement with current plan.    MEDS ORDERED DURING VISIT:  New Medications Ordered This Visit   Medications   • traZODone (DESYREL) 100 MG tablet     Sig: Take 1 tablet by mouth Every Night.     Dispense:  30 tablet     Refill:  1   • busPIRone (BUSPAR) 7.5 MG tablet     Sig: Take 1 tablet by mouth 2 (Two) Times a Day.     Dispense:  60 tablet     Refill:  2   • sertraline (ZOLOFT) 100 MG tablet     Sig: Take 1 tablet by mouth Every Night.     Dispense:  30 tablet     Refill:  2   • propranolol (INDERAL) 10 MG tablet     Sig: Take 1 tablet by mouth 2 (Two) Times a Day As Needed (anxiety).     Dispense:  60 tablet     Refill:  2   • amphetamine-dextroamphetamine XR (Adderall XR) 15 MG 24 hr capsule     Sig: Take 1 capsule by mouth Every Morning     Dispense:  30 capsule     Refill:  0       I spent 21 minutes caring for Jeanne on this date of service. This time includes time spent by me in the following activities:preparing for the visit, performing a medically appropriate examination and/or evaluation , counseling and educating the patient/family/caregiver, ordering medications, tests, or procedures and documenting information in the medical record  Follow Up   Return in about 2 months (around 3/5/2022) for Medication Check.  Patient was given instructions and counseling regarding her condition or for health maintenance advice. Please see specific information pulled into the AVS if appropriate.       TREATMENT PLAN/GOALS: Continue supportive psychotherapy efforts and medications  as indicated. Treatment and medication options discussed during today's visit. Patient acknowledged and verbally consented to continue with current treatment plan and was educated on the importance of compliance with treatment and follow-up appointments.    MEDICATION ISSUES:  Discussed medication options and treatment plan of prescribed medication as well as the risks, benefits, and side effects including potential falls, possible impaired driving and metabolic adversities among others. Patient is agreeable to call the office with any worsening of symptoms or onset of side effects. Patient is agreeable to call 911 or go to the nearest ER should he/she begin having SI/HI.        This document has been electronically signed by ABILIO Ocasio, PMHNP-BC  January 5, 2022 18:10 EST    Part of this note may be an electronic transcription/translation of spoken language to printed text using the Dragon Dictation System.

## 2022-03-08 ENCOUNTER — OFFICE VISIT (OUTPATIENT)
Dept: PSYCHIATRY | Facility: CLINIC | Age: 32
End: 2022-03-08

## 2022-03-08 VITALS
HEIGHT: 62 IN | SYSTOLIC BLOOD PRESSURE: 118 MMHG | DIASTOLIC BLOOD PRESSURE: 74 MMHG | WEIGHT: 255 LBS | BODY MASS INDEX: 46.93 KG/M2

## 2022-03-08 DIAGNOSIS — F32.4 MAJOR DEPRESSIVE DISORDER WITH SINGLE EPISODE, IN PARTIAL REMISSION: ICD-10-CM

## 2022-03-08 DIAGNOSIS — F90.0 ADHD (ATTENTION DEFICIT HYPERACTIVITY DISORDER), INATTENTIVE TYPE: Primary | ICD-10-CM

## 2022-03-08 DIAGNOSIS — G47.09 OTHER INSOMNIA: ICD-10-CM

## 2022-03-08 DIAGNOSIS — F41.1 GAD (GENERALIZED ANXIETY DISORDER): ICD-10-CM

## 2022-03-08 PROCEDURE — 99213 OFFICE O/P EST LOW 20 MIN: CPT | Performed by: NURSE PRACTITIONER

## 2022-03-08 RX ORDER — DEXTROAMPHETAMINE SACCHARATE, AMPHETAMINE ASPARTATE MONOHYDRATE, DEXTROAMPHETAMINE SULFATE AND AMPHETAMINE SULFATE 3.75; 3.75; 3.75; 3.75 MG/1; MG/1; MG/1; MG/1
15 CAPSULE, EXTENDED RELEASE ORAL EVERY MORNING
Qty: 30 CAPSULE | Refills: 0 | Status: SHIPPED | OUTPATIENT
Start: 2022-03-08 | End: 2022-04-22 | Stop reason: SDUPTHER

## 2022-03-08 RX ORDER — SERTRALINE HYDROCHLORIDE 100 MG/1
150 TABLET, FILM COATED ORAL NIGHTLY
Qty: 45 TABLET | Refills: 2 | Status: SHIPPED | OUTPATIENT
Start: 2022-03-08 | End: 2022-06-26 | Stop reason: SDUPTHER

## 2022-03-08 RX ORDER — TRAZODONE HYDROCHLORIDE 100 MG/1
100 TABLET ORAL NIGHTLY
Qty: 30 TABLET | Refills: 1 | Status: SHIPPED | OUTPATIENT
Start: 2022-03-08 | End: 2022-05-09

## 2022-03-08 NOTE — PROGRESS NOTES
"Chief Complaint  Depression, anxiety, and ADHD      Subjective          Jeanne Jack presents to [facility] by herself for a follow up and medication check.    History of Present Illness: Jeanne states, \"I am good.\" Jeanne tells me she like the adderall Er more than IR as she does not feel as \"hyperfocused\" when the medication takes effect. She feels she get the benefits for about 8 hours. She continues to work and go to school. She is enjoying her new job. She does feel occasional symptoms of depression at times throughout her day at work. She reports feeling sadness, depressed mood, and becomes tearful at times. She does not feel their has been any changes to sleep or appetite. She continues to struggle initiating and sustaining sleep. She is unsure if she snores as her sleep partner is a heavy sleeper. Trazodone has not been effective at treating insomnia. She is interested in having a sleep study. She is no longer using marijuana.  She is compliant with her current medication regiment.  She is taking BuSpar, propranolol, Zoloft, trazodone, and Adderall.    Current Medications:   Current Outpatient Medications   Medication Sig Dispense Refill   • amphetamine-dextroamphetamine XR (Adderall XR) 15 MG 24 hr capsule Take 1 capsule by mouth Every Morning 30 capsule 0   • busPIRone (BUSPAR) 7.5 MG tablet Take 1 tablet by mouth 2 (Two) Times a Day. 60 tablet 2   • multivitamin with minerals tablet tablet multivitamin     • propranolol (INDERAL) 10 MG tablet Take 1 tablet by mouth 2 (Two) Times a Day As Needed (anxiety). 60 tablet 2   • sertraline (ZOLOFT) 100 MG tablet Take 1.5 tablets by mouth Every Night. 45 tablet 2   • traZODone (DESYREL) 100 MG tablet Take 1 tablet by mouth Every Night. 30 tablet 1     No current facility-administered medications for this visit.         Objective   Vital Signs:   /74   Ht 157.5 cm (62\")   Wt 116 kg (255 lb)   BMI 46.64 kg/m²     Physical " Exam  Vitals and nursing note reviewed.   Constitutional:       Appearance: Normal appearance. She is well-developed. She is obese.   Musculoskeletal:         General: Normal range of motion.   Skin:     General: Skin is warm and dry.   Neurological:      Mental Status: She is alert and oriented to person, place, and time.   Psychiatric:         Attention and Perception: Attention normal.         Mood and Affect: Mood normal.         Speech: Speech normal.         Behavior: Behavior normal. Behavior is cooperative.         Thought Content: Thought content normal.         Cognition and Memory: Cognition normal.         Judgment: Judgment normal.        Result Review :                   Assessment and Plan    Problem List Items Addressed This Visit    None     Visit Diagnoses     ADHD (attention deficit hyperactivity disorder), inattentive type    -  Primary    Relevant Medications    traZODone (DESYREL) 100 MG tablet    amphetamine-dextroamphetamine XR (Adderall XR) 15 MG 24 hr capsule    sertraline (ZOLOFT) 100 MG tablet    Other insomnia        Relevant Medications    traZODone (DESYREL) 100 MG tablet    Major depressive disorder with single episode, in partial remission (HCC)        Relevant Medications    traZODone (DESYREL) 100 MG tablet    amphetamine-dextroamphetamine XR (Adderall XR) 15 MG 24 hr capsule    sertraline (ZOLOFT) 100 MG tablet    ERA (generalized anxiety disorder)        Relevant Medications    traZODone (DESYREL) 100 MG tablet    amphetamine-dextroamphetamine XR (Adderall XR) 15 MG 24 hr capsule    sertraline (ZOLOFT) 100 MG tablet          Mental Status Exam:   Hygiene:   good  Cooperation:  Cooperative  Eye Contact:  Good  Psychomotor Behavior:  Appropriate  Affect:  Appropriate  Mood: normal  Speech:  Normal  Thought Process:  Goal directed and Linear  Thought Content:  Normal  Suicidal:  None  Homicidal:  None  Hallucinations:  None  Delusion:  None  Memory:  Intact  Orientation:  Person,  Place, Time and Situation  Reliability:  good  Insight:  Good  Judgement:  Good  Impulse Control:  Good  Physical/Medical Issues:  Yes Obesity per BMI       PHQ-9 Score:   PHQ-9 Total Score:      Impression/Plan:  -This is a follow up and medication check.  Jeanne reports mild symptoms of depression. She feels sadness and becomes tearful during the day. She is not sleeping well and would like to evaluate her sleep with a sleep study as medication has not been effective for helping with insomnia. She struggles to initiate and sustain sleep. She reports good focus, concentration, and attention. She is working and going to school. I suggested increasing Zoloft for further benefits on mood and she agrees.   -Increase Zoloft 100 mg nightly for depression and anxiety.  -Continue Adderall XR 15 mg daily for ADHD symptoms.     -Continue BuSpar 7.5 mg twice daily for anxiety.  -Continue propranolol 10 mg twice daily as needed for anxiety.  -Continue  trazodone to 100 mg nightly for insomnia.   -The SILVANA report, reviewed through PDMP, of the past 12 months were reviewed and is appropriate.  The patient/guardian reports taking the medication only as prescribed.  The patient/guardian denies any abuse or misuse of the medication.  The patient/guardian denies any other substance use or issues.  There are no apparent substance related issues.  The patient reports no side effects of the current medication usage.  The patient/guardian has reported significant improvement with medication usage and wishes to continue medication as prescribed.  The patient/guardian is appropriate to continue with current medication usage at this time.  Reinforced risks and side effects of medication usage, patient and/or guardian verbalize understanding in their own words and are in agreement with current plan.  -Consult neurology.   MEDS ORDERED DURING VISIT:  New Medications Ordered This Visit   Medications   • traZODone (DESYREL) 100 MG tablet      Sig: Take 1 tablet by mouth Every Night.     Dispense:  30 tablet     Refill:  1   • amphetamine-dextroamphetamine XR (Adderall XR) 15 MG 24 hr capsule     Sig: Take 1 capsule by mouth Every Morning     Dispense:  30 capsule     Refill:  0   • sertraline (ZOLOFT) 100 MG tablet     Sig: Take 1.5 tablets by mouth Every Night.     Dispense:  45 tablet     Refill:  2       I spent 21 minutes caring for Jeanne on this date of service. This time includes time spent by me in the following activities:preparing for the visit, performing a medically appropriate examination and/or evaluation , counseling and educating the patient/family/caregiver, ordering medications, tests, or procedures, documenting information in the medical record and care coordination  Follow Up   Return in about 2 months (around 5/8/2022) for Medication Check.  Patient was given instructions and counseling regarding her condition or for health maintenance advice. Please see specific information pulled into the AVS if appropriate.       TREATMENT PLAN/GOALS: Continue supportive psychotherapy efforts and medications as indicated. Treatment and medication options discussed during today's visit. Patient acknowledged and verbally consented to continue with current treatment plan and was educated on the importance of compliance with treatment and follow-up appointments.    MEDICATION ISSUES:  Discussed medication options and treatment plan of prescribed medication as well as the risks, benefits, and side effects including potential falls, possible impaired driving and metabolic adversities among others. Patient is agreeable to call the office with any worsening of symptoms or onset of side effects. Patient is agreeable to call 911 or go to the nearest ER should he/she begin having SI/HI.        This document has been electronically signed by ABILIO Ocasio, PMHNP-BC  March 8, 2022 09:03 EST    Part of this note may be an electronic  transcription/translation of spoken language to printed text using the Dragon Dictation System.

## 2022-03-22 ENCOUNTER — OFFICE VISIT (OUTPATIENT)
Dept: NEUROLOGY | Facility: CLINIC | Age: 32
End: 2022-03-22

## 2022-03-22 VITALS
HEART RATE: 102 BPM | SYSTOLIC BLOOD PRESSURE: 122 MMHG | DIASTOLIC BLOOD PRESSURE: 80 MMHG | OXYGEN SATURATION: 98 % | WEIGHT: 257.4 LBS | HEIGHT: 62 IN | TEMPERATURE: 97.3 F | BODY MASS INDEX: 47.37 KG/M2

## 2022-03-22 DIAGNOSIS — R06.83 SNORING: ICD-10-CM

## 2022-03-22 DIAGNOSIS — G47.9 RESTLESS SLEEPER: ICD-10-CM

## 2022-03-22 DIAGNOSIS — F39 MOOD DISORDER: ICD-10-CM

## 2022-03-22 DIAGNOSIS — G47.00 INSOMNIA, UNSPECIFIED TYPE: Primary | ICD-10-CM

## 2022-03-22 PROCEDURE — 99213 OFFICE O/P EST LOW 20 MIN: CPT | Performed by: NURSE PRACTITIONER

## 2022-03-22 NOTE — PROGRESS NOTES
New Sleep Patient Office Visit      Patient Name: Jeanne Jack  : 1990   MRN: [id number]     Referring Physician: Kimmy Cloud    Chief Complaint:    Chief Complaint   Patient presents with   • Follow-up     NP, in office to establish care for lonnie.       History of Present Illness: Jeanne Jack is a 32 y.o. female who is here today to establish care with Sleep Medicine.  Sleep questionnaire reviewed- taking Trazodone for sleep, takes about 1-2 hours to fall asleep at night, wakes up 2-3 times during sleep and has difficulty falling back to sleep, sleeps about 5 hours per night, experiences restless or disturbed sleep, feels better with more sleep, snores intermittently, wakes up with a dry mouth, experiences memory loss/decreased concentration/daytime sleepiness.  Additional risk factors- BMI 47, mood disorder, insomnia, possible family history of significant LONNIE.     Cassel Score: 4    Subjective      Review of Systems:   Review of Systems   Constitutional: Positive for fatigue. Negative for fever, unexpected weight gain and unexpected weight loss.   HENT: Negative for hearing loss, sore throat, swollen glands, tinnitus and trouble swallowing.    Eyes: Negative for blurred vision, double vision, photophobia and visual disturbance.   Respiratory: Negative for cough, chest tightness and shortness of breath.    Cardiovascular: Negative for chest pain, palpitations and leg swelling.   Gastrointestinal: Negative for constipation, diarrhea and nausea.   Endocrine: Negative for cold intolerance and heat intolerance.   Musculoskeletal: Negative for gait problem, neck pain and neck stiffness.   Skin: Negative for color change and rash.   Allergic/Immunologic: Negative for environmental allergies and food allergies.   Neurological: Negative for dizziness, syncope, facial asymmetry, speech difficulty, weakness, headache, memory problem and confusion.   Psychiatric/Behavioral: Positive for sleep  "disturbance. Negative for agitation, behavioral problems and depressed mood. The patient is not nervous/anxious.        Past Medical History:   Past Medical History:   Diagnosis Date   • ADHD (attention deficit hyperactivity disorder)        Past Surgical History:   Past Surgical History:   Procedure Laterality Date   • TONSILLECTOMY     • WISDOM TOOTH EXTRACTION         Family History:   Family History   Problem Relation Age of Onset   • No Known Problems Mother    • No Known Problems Father    • ADD / ADHD Son    • ADD / ADHD Son        Social History:   Social History     Socioeconomic History   • Marital status: Single   Tobacco Use   • Smoking status: Never Smoker   • Smokeless tobacco: Never Used   Vaping Use   • Vaping Use: Never used   Substance and Sexual Activity   • Alcohol use: No   • Drug use: No   • Sexual activity: Defer       Medications:     Current Outpatient Medications:   •  amphetamine-dextroamphetamine XR (Adderall XR) 15 MG 24 hr capsule, Take 1 capsule by mouth Every Morning, Disp: 30 capsule, Rfl: 0  •  busPIRone (BUSPAR) 7.5 MG tablet, Take 1 tablet by mouth 2 (Two) Times a Day., Disp: 60 tablet, Rfl: 2  •  multivitamin with minerals tablet tablet, multivitamin, Disp: , Rfl:   •  propranolol (INDERAL) 10 MG tablet, Take 1 tablet by mouth 2 (Two) Times a Day As Needed (anxiety)., Disp: 60 tablet, Rfl: 2  •  sertraline (ZOLOFT) 100 MG tablet, Take 1.5 tablets by mouth Every Night., Disp: 45 tablet, Rfl: 2  •  traZODone (DESYREL) 100 MG tablet, Take 1 tablet by mouth Every Night., Disp: 30 tablet, Rfl: 1    Allergies:   No Known Allergies    Objective     Physical Exam:  Vital Signs:   Vitals:    03/22/22 1542   BP: 122/80   BP Location: Right arm   Patient Position: Sitting   Cuff Size: Adult   Pulse: 102   Temp: 97.3 °F (36.3 °C)   SpO2: 98%   Weight: 117 kg (257 lb 6.4 oz)   Height: 157.5 cm (62\")   PainSc: 0-No pain     BMI: Body mass index is 47.08 kg/m².  Neck Circumference:  14 " 1/2    Physical Exam  Vitals and nursing note reviewed.   Constitutional:       General: She is not in acute distress.     Appearance: She is well-developed. She is obese. She is not diaphoretic.   HENT:      Head: Normocephalic and atraumatic.      Comments: Mallampati 4  Eyes:      Extraocular Movements: Extraocular movements intact.      Conjunctiva/sclera: Conjunctivae normal.   Neck:      Trachea: Trachea normal.   Cardiovascular:      Rate and Rhythm: Normal rate and regular rhythm.      Heart sounds: Normal heart sounds. No murmur heard.    No friction rub. No gallop.   Pulmonary:      Effort: Pulmonary effort is normal. No respiratory distress.      Breath sounds: Normal breath sounds. No wheezing or rales.   Musculoskeletal:         General: Normal range of motion.   Skin:     General: Skin is warm and dry.      Findings: No rash.   Neurological:      Mental Status: She is alert and oriented to person, place, and time.   Psychiatric:         Mood and Affect: Mood normal.         Behavior: Behavior normal.         Thought Content: Thought content normal.         Judgment: Judgment normal.         Assessment / Plan      Assessment/Plan:   Diagnoses and all orders for this visit:    1. Insomnia, unspecified type (Primary)  -     Polysomnography 4 or More Parameters; Future    2. Mood disorder (HCC)  -     Polysomnography 4 or More Parameters; Future    3. Snoring  -     Polysomnography 4 or More Parameters; Future    4. Restless sleeper  -     Polysomnography 4 or More Parameters; Future    5. BMI 45.0-49.9, adult (HCC)  -     Polysomnography 4 or More Parameters; Future    *Education on BO and insomnia provided today.   *Encouraged weight loss with a BMI goal of 24.  *Advised no driving if drowsy.   *If PSG too expensive can do a home sleep study.     Follow Up:   Return in about 3 months (around 6/22/2022) for F/U Obstructive Sleep Apnea.    I have advised the patient the need to continue the use of CPAP.   Gold standard for treatment of sleep apnea includes weight loss, use of cpap and avoidance of alcohol.  Untreated BO may increase the risk for development of hypertension, stroke, myocardial infarction, diabetes, cardiovascular disease, work-related issues and driving accidents. I have counseled and advised the patient to avoid driving or operating heavy/dangerous equipment if feeling drowsy.     ABILIO Dawn, FNP-C  [facility]       Please note that portions of this note may have been completed with a voice recognition program. Efforts were made to edit the dictations, but occasionally words are mistranscribed.

## 2022-04-10 PROCEDURE — U0004 COV-19 TEST NON-CDC HGH THRU: HCPCS | Performed by: EMERGENCY MEDICINE

## 2022-04-12 ENCOUNTER — HOSPITAL ENCOUNTER (OUTPATIENT)
Dept: SLEEP MEDICINE | Facility: HOSPITAL | Age: 32
Discharge: HOME | End: 2022-04-12
Admitting: NURSE PRACTITIONER

## 2022-04-12 DIAGNOSIS — G47.00 INSOMNIA, UNSPECIFIED TYPE: ICD-10-CM

## 2022-04-12 DIAGNOSIS — R06.83 SNORING: ICD-10-CM

## 2022-04-12 DIAGNOSIS — F39 MOOD DISORDER: ICD-10-CM

## 2022-04-12 DIAGNOSIS — G47.9 RESTLESS SLEEPER: ICD-10-CM

## 2022-04-12 PROCEDURE — 95810 POLYSOM 6/> YRS 4/> PARAM: CPT | Performed by: INTERNAL MEDICINE

## 2022-04-12 PROCEDURE — 95810 POLYSOM 6/> YRS 4/> PARAM: CPT

## 2022-04-22 ENCOUNTER — TELEPHONE (OUTPATIENT)
Dept: NEUROLOGY | Facility: CLINIC | Age: 32
End: 2022-04-22

## 2022-04-22 DIAGNOSIS — F90.0 ADHD (ATTENTION DEFICIT HYPERACTIVITY DISORDER), INATTENTIVE TYPE: ICD-10-CM

## 2022-04-22 RX ORDER — DEXTROAMPHETAMINE SACCHARATE, AMPHETAMINE ASPARTATE MONOHYDRATE, DEXTROAMPHETAMINE SULFATE AND AMPHETAMINE SULFATE 3.75; 3.75; 3.75; 3.75 MG/1; MG/1; MG/1; MG/1
15 CAPSULE, EXTENDED RELEASE ORAL EVERY MORNING
Qty: 30 CAPSULE | Refills: 0 | Status: SHIPPED | OUTPATIENT
Start: 2022-04-22 | End: 2022-05-31 | Stop reason: SDUPTHER

## 2022-04-22 NOTE — TELEPHONE ENCOUNTER
Attempted to contact patient again. No answer when calling. Mailed test results to patient.     Per Carlota Boss patients sleep study was negative for significant BO but her sleep architecture was poor=she didn't sleep very well.  She should discuss her symptoms further with her referring provider.     Patients follow up appointment has been canceled. If she were to need anything she is more than welcome to call us.

## 2022-04-22 NOTE — TELEPHONE ENCOUNTER
Chart review completed and medication refill approved.   Patient's SILVANA report reviewed and deemed appropriate.

## 2022-04-22 NOTE — TELEPHONE ENCOUNTER
Provider: BRYAN HOGAN   Caller: SARAH   Relationship to Patient: PT   Phone Number: [phone]  Reason for Call: PT CALLED, STATES THAT SOMEONE CALLED HER, THINKS IT WAS ABOUT SLEEP STUDY. TRIED TO CONTACT OFFICE STAFF. PLEASE CONTACT PT BACK ABOUT SLEEP STUDY.

## 2022-05-08 NOTE — PROGRESS NOTES
"Chief Complaint  Depression, anxiety, insomnia, and ADHD, inattentive type      Subjective          Jeanne Jack presents to [facility] by herself for a follow up and medication check.    History of Present Illness: Jeanne states, \"I am doing okay.\"  Jeanne tells me that her anxiety has been \"out of whack\" which causes some symptoms of mild depression.  She tells me that the propranolol is helping her anxiety. She is taking the medication 1-2 times daily as needed.   She has started a new position and has to get up earlier and travel.  She continues to experience difficulty with sleep.  A chart review reveals that she does not have a diagnosis of sleep apnea after a polysomnography exam by Carlota Mai in neurology.  She is not experiencing REM sleep.  She continues with difficulty initiating sleep and feels very tired when it is time to wake up.  She is compliant with her current medication regiment.  She is taking BuSpar, propranolol, Zoloft, trazodone, and Adderall.    Current Medications:   Current Outpatient Medications   Medication Sig Dispense Refill   • amphetamine-dextroamphetamine XR (Adderall XR) 15 MG 24 hr capsule Take 1 capsule by mouth Every Morning 30 capsule 0   • busPIRone (BUSPAR) 7.5 MG tablet Take 1 tablet by mouth 2 (Two) Times a Day. 60 tablet 2   • multivitamin with minerals tablet tablet multivitamin     • propranolol (INDERAL) 10 MG tablet Take 1 tablet by mouth 2 (Two) Times a Day As Needed (anxiety). 60 tablet 2   • sertraline (ZOLOFT) 100 MG tablet Take 1.5 tablets by mouth Every Night. 45 tablet 2   • nortriptyline (Pamelor) 50 MG capsule Take 1 capsule by mouth Every Night. 30 capsule 2     No current facility-administered medications for this visit.         Objective   Vital Signs:   /74   Pulse 76   Ht 157.5 cm (62\")   Wt 119 kg (262 lb)   BMI 47.92 kg/m²     Physical Exam  Vitals and nursing note reviewed.   Constitutional:       " Appearance: Normal appearance. She is well-developed. She is obese.   Musculoskeletal:         General: Normal range of motion.   Skin:     General: Skin is warm and dry.   Neurological:      Mental Status: She is alert and oriented to person, place, and time.   Psychiatric:         Attention and Perception: Attention normal.         Mood and Affect: Mood normal.         Speech: Speech normal.         Behavior: Behavior normal. Behavior is cooperative.         Thought Content: Thought content normal.         Cognition and Memory: Cognition normal.         Judgment: Judgment normal.        Result Review :     The following data was reviewed by ABILIO Davis on 05/09/2022:      Polysomnography 4 or More Parameters (04/13/2022 05:50)  Office Visit with Carlota Boss APRN (03/22/2022)           Assessment and Plan    Problem List Items Addressed This Visit    None     Visit Diagnoses     ADHD (attention deficit hyperactivity disorder), inattentive type    -  Primary    Relevant Medications    nortriptyline (Pamelor) 50 MG capsule    busPIRone (BUSPAR) 7.5 MG tablet    Other insomnia        Relevant Medications    nortriptyline (Pamelor) 50 MG capsule    Major depressive disorder with single episode, in partial remission (HCC)        Relevant Medications    nortriptyline (Pamelor) 50 MG capsule    busPIRone (BUSPAR) 7.5 MG tablet    ERA (generalized anxiety disorder)        Relevant Medications    propranolol (INDERAL) 10 MG tablet    nortriptyline (Pamelor) 50 MG capsule    busPIRone (BUSPAR) 7.5 MG tablet          Mental Status Exam:   Hygiene:   good  Cooperation:  Cooperative  Eye Contact:  Good  Psychomotor Behavior:  Appropriate  Affect:  Appropriate  Mood: normal  Speech:  Normal  Thought Process:  Goal directed and Linear  Thought Content:  Normal  Suicidal:  None  Homicidal:  None  Hallucinations:  None  Delusion:  None  Memory:  Intact  Orientation:  Person, Place, Time and  Situation  Reliability:  good  Insight:  Good  Judgement:  Good  Impulse Control:  Good  Physical/Medical Issues:  Yes Obesity per BMI       PHQ-9 Score:   PHQ-9 Total Score: 1     Impression/Plan:  -This is a follow up and medication check.  Jeanne reports increased anxiety with start of new job. She has mild depression but feels the increase in Zoloft is helping. She has good focus, concentration, and attention at work. She is motivated to adjust her medications to improve sleep after discovering she does not have sleep apnea. I suggested changing trazodone to Pamelor. I explained the purpose and dosing. She agrees.   -Stop trazodone due to perceived lack of efficacy.  -Initiate Pamelor 50 mg nightly for insomnia.  I explained the purpose of this medication to Jeanne.  We discussed the risk versus benefits of adding this medication to her regimen, as well as potential side effects.  She verbalized understanding.  -Continue Zoloft 150 mg nightly for depression and anxiety.  Patient has refills.  -Continue Adderall XR 15 mg daily for ADHD symptoms.    Patient has refills.  -Continue BuSpar 7.5 mg twice daily for anxiety.  -Continue propranolol 10 mg twice daily as needed for anxiety.  -The SILVANA report, reviewed through PDMP, of the past 12 months were reviewed and is appropriate.  The patient/guardian reports taking the medication only as prescribed.  The patient/guardian denies any abuse or misuse of the medication.  The patient/guardian denies any other substance use or issues.  There are no apparent substance related issues.  The patient reports no side effects of the current medication usage.  The patient/guardian has reported significant improvement with medication usage and wishes to continue medication as prescribed.  The patient/guardian is appropriate to continue with current medication usage at this time.  Reinforced risks and side effects of medication usage, patient and/or guardian verbalize  understanding in their own words and are in agreement with current plan.      MEDS ORDERED DURING VISIT:  New Medications Ordered This Visit   Medications   • propranolol (INDERAL) 10 MG tablet     Sig: Take 1 tablet by mouth 2 (Two) Times a Day As Needed (anxiety).     Dispense:  60 tablet     Refill:  2   • nortriptyline (Pamelor) 50 MG capsule     Sig: Take 1 capsule by mouth Every Night.     Dispense:  30 capsule     Refill:  2   • busPIRone (BUSPAR) 7.5 MG tablet     Sig: Take 1 tablet by mouth 2 (Two) Times a Day.     Dispense:  60 tablet     Refill:  2       I spent 17 minutes caring for Jeanne on this date of service. This time includes time spent by me in the following activities:preparing for the visit, performing a medically appropriate examination and/or evaluation , counseling and educating the patient/family/caregiver, ordering medications, tests, or procedures, documenting information in the medical record and care coordination     Follow Up   Return in about 2 months (around 7/9/2022) for Medication Check.  Patient was given instructions and counseling regarding her condition or for health maintenance advice. Please see specific information pulled into the AVS if appropriate.       TREATMENT PLAN/GOALS: Continue supportive psychotherapy efforts and medications as indicated. Treatment and medication options discussed during today's visit. Patient acknowledged and verbally consented to continue with current treatment plan and was educated on the importance of compliance with treatment and follow-up appointments.    MEDICATION ISSUES:  Discussed medication options and treatment plan of prescribed medication as well as the risks, benefits, and side effects including potential falls, possible impaired driving and metabolic adversities among others. Patient is agreeable to call the office with any worsening of symptoms or onset of side effects. Patient is agreeable to call 911 or go to the nearest ER  should he/she begin having SI/HI.        This document has been electronically signed by ABILIO Ocasio, PMHNP-BC  May 9, 2022 09:57 EDT    Part of this note may be an electronic transcription/translation of spoken language to printed text using the Dragon Dictation System.

## 2022-05-09 ENCOUNTER — OFFICE VISIT (OUTPATIENT)
Dept: PSYCHIATRY | Facility: CLINIC | Age: 32
End: 2022-05-09

## 2022-05-09 VITALS
DIASTOLIC BLOOD PRESSURE: 74 MMHG | WEIGHT: 262 LBS | HEIGHT: 62 IN | SYSTOLIC BLOOD PRESSURE: 122 MMHG | BODY MASS INDEX: 48.21 KG/M2 | HEART RATE: 76 BPM

## 2022-05-09 DIAGNOSIS — F90.0 ADHD (ATTENTION DEFICIT HYPERACTIVITY DISORDER), INATTENTIVE TYPE: Primary | ICD-10-CM

## 2022-05-09 DIAGNOSIS — F32.4 MAJOR DEPRESSIVE DISORDER WITH SINGLE EPISODE, IN PARTIAL REMISSION: ICD-10-CM

## 2022-05-09 DIAGNOSIS — F41.1 GAD (GENERALIZED ANXIETY DISORDER): ICD-10-CM

## 2022-05-09 DIAGNOSIS — G47.09 OTHER INSOMNIA: ICD-10-CM

## 2022-05-09 PROCEDURE — 99212 OFFICE O/P EST SF 10 MIN: CPT | Performed by: NURSE PRACTITIONER

## 2022-05-09 RX ORDER — NORTRIPTYLINE HYDROCHLORIDE 50 MG/1
50 CAPSULE ORAL NIGHTLY
Qty: 30 CAPSULE | Refills: 2 | Status: SHIPPED | OUTPATIENT
Start: 2022-05-09 | End: 2022-07-12

## 2022-05-09 RX ORDER — BUSPIRONE HYDROCHLORIDE 7.5 MG/1
7.5 TABLET ORAL 2 TIMES DAILY
Qty: 60 TABLET | Refills: 2 | Status: SHIPPED | OUTPATIENT
Start: 2022-05-09 | End: 2022-07-12 | Stop reason: SDUPTHER

## 2022-05-09 RX ORDER — PROPRANOLOL HYDROCHLORIDE 10 MG/1
10 TABLET ORAL 2 TIMES DAILY PRN
Qty: 60 TABLET | Refills: 2 | Status: SHIPPED | OUTPATIENT
Start: 2022-05-09 | End: 2022-07-12 | Stop reason: SDUPTHER

## 2022-05-31 DIAGNOSIS — F90.0 ADHD (ATTENTION DEFICIT HYPERACTIVITY DISORDER), INATTENTIVE TYPE: ICD-10-CM

## 2022-05-31 RX ORDER — DEXTROAMPHETAMINE SACCHARATE, AMPHETAMINE ASPARTATE MONOHYDRATE, DEXTROAMPHETAMINE SULFATE AND AMPHETAMINE SULFATE 3.75; 3.75; 3.75; 3.75 MG/1; MG/1; MG/1; MG/1
15 CAPSULE, EXTENDED RELEASE ORAL EVERY MORNING
Qty: 30 CAPSULE | Refills: 0 | Status: SHIPPED | OUTPATIENT
Start: 2022-05-31 | End: 2022-07-08 | Stop reason: SDUPTHER

## 2022-05-31 NOTE — TELEPHONE ENCOUNTER
Rx Refill Note  Requested Prescriptions     Pending Prescriptions Disp Refills   • amphetamine-dextroamphetamine XR (Adderall XR) 15 MG 24 hr capsule 30 capsule 0     Sig: Take 1 capsule by mouth Every Morning      Last office visit with prescribing clinician: 5/9/2022      Next office visit with prescribing clinician: 7/12/2022            Kamini Nelson MA  05/31/22, 14:40 EDT

## 2022-06-26 DIAGNOSIS — F41.1 GAD (GENERALIZED ANXIETY DISORDER): ICD-10-CM

## 2022-07-05 RX ORDER — SERTRALINE HYDROCHLORIDE 100 MG/1
150 TABLET, FILM COATED ORAL NIGHTLY
Qty: 45 TABLET | Refills: 2 | Status: SHIPPED | OUTPATIENT
Start: 2022-07-05 | End: 2022-07-12

## 2022-07-08 DIAGNOSIS — F90.0 ADHD (ATTENTION DEFICIT HYPERACTIVITY DISORDER), INATTENTIVE TYPE: ICD-10-CM

## 2022-07-08 RX ORDER — DEXTROAMPHETAMINE SACCHARATE, AMPHETAMINE ASPARTATE MONOHYDRATE, DEXTROAMPHETAMINE SULFATE AND AMPHETAMINE SULFATE 3.75; 3.75; 3.75; 3.75 MG/1; MG/1; MG/1; MG/1
15 CAPSULE, EXTENDED RELEASE ORAL EVERY MORNING
Qty: 30 CAPSULE | Refills: 0 | Status: SHIPPED | OUTPATIENT
Start: 2022-07-08 | End: 2022-08-12 | Stop reason: SDUPTHER

## 2022-07-08 NOTE — TELEPHONE ENCOUNTER
Rx Refill Note  Requested Prescriptions     Pending Prescriptions Disp Refills   • amphetamine-dextroamphetamine XR (Adderall XR) 15 MG 24 hr capsule 30 capsule 0     Sig: Take 1 capsule by mouth Every Morning      Last office visit with prescribing clinician: 5/9/2022      Next office visit with prescribing clinician: 7/12/2022            Kamini Nelson MA  07/08/22, 12:40 EDT

## 2022-07-12 ENCOUNTER — OFFICE VISIT (OUTPATIENT)
Dept: PSYCHIATRY | Facility: CLINIC | Age: 32
End: 2022-07-12

## 2022-07-12 VITALS
BODY MASS INDEX: 46.74 KG/M2 | WEIGHT: 254 LBS | SYSTOLIC BLOOD PRESSURE: 122 MMHG | HEIGHT: 62 IN | HEART RATE: 95 BPM | DIASTOLIC BLOOD PRESSURE: 78 MMHG

## 2022-07-12 DIAGNOSIS — G47.09 OTHER INSOMNIA: ICD-10-CM

## 2022-07-12 DIAGNOSIS — F32.4 MAJOR DEPRESSIVE DISORDER WITH SINGLE EPISODE, IN PARTIAL REMISSION: Chronic | ICD-10-CM

## 2022-07-12 DIAGNOSIS — F41.1 GAD (GENERALIZED ANXIETY DISORDER): Chronic | ICD-10-CM

## 2022-07-12 DIAGNOSIS — F90.0 ADHD (ATTENTION DEFICIT HYPERACTIVITY DISORDER), INATTENTIVE TYPE: Primary | ICD-10-CM

## 2022-07-12 PROCEDURE — 99214 OFFICE O/P EST MOD 30 MIN: CPT | Performed by: NURSE PRACTITIONER

## 2022-07-12 RX ORDER — SERTRALINE HYDROCHLORIDE 100 MG/1
200 TABLET, FILM COATED ORAL NIGHTLY
Qty: 60 TABLET | Refills: 2 | Status: SHIPPED | OUTPATIENT
Start: 2022-07-12

## 2022-07-12 RX ORDER — NORTRIPTYLINE HYDROCHLORIDE 75 MG/1
75 CAPSULE ORAL NIGHTLY
Qty: 30 CAPSULE | Refills: 2 | OUTPATIENT
Start: 2022-07-12 | End: 2022-09-09

## 2022-07-12 RX ORDER — BUSPIRONE HYDROCHLORIDE 7.5 MG/1
7.5 TABLET ORAL 2 TIMES DAILY
Qty: 60 TABLET | Refills: 2 | Status: SHIPPED | OUTPATIENT
Start: 2022-07-12

## 2022-07-12 RX ORDER — PROPRANOLOL HYDROCHLORIDE 10 MG/1
10 TABLET ORAL 2 TIMES DAILY PRN
Qty: 60 TABLET | Refills: 2 | OUTPATIENT
Start: 2022-07-12 | End: 2022-09-09

## 2022-07-12 NOTE — PROGRESS NOTES
"Chief Complaint  Depression, anxiety, insomnia, and ADHD, inattentive type      Subjective          Jeanne Jack presents to [facility] by herself for a follow up and medication check.    History of Present Illness: Jeanne states, \"I am okay.\" Jeanne tells me she feels her sleep medication could be increased. She is experiencing sadness which comes on at random times. She tells me it can last hours to days and she rests or lays down to improve her mood. She reports moderate symptoms of depression and mild anxiety. 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 She finds it less stressful. She tells me her family is doing well and her children are in summer camps. She is taking classes and has one more to complete this summer-Women's Studies. She denies any problems with appetite.  She is compliant with her current medication regiment.  She is taking BuSpar, propranolol, Zoloft, trazodone, and Adderall XR. She denies any side effects. She denies any SI/HI/AVH.    Current Medications:   Current Outpatient Medications   Medication Sig Dispense Refill   • amphetamine-dextroamphetamine XR (Adderall XR) 15 MG 24 hr capsule Take 1 capsule by mouth Every Morning 30 capsule 0   • busPIRone (BUSPAR) 7.5 MG tablet Take 1 tablet by mouth 2 (Two) Times a Day. 60 tablet 2   • nortriptyline (Pamelor) 75 MG capsule Take 1 capsule by mouth Every Night. 30 capsule 2   • propranolol (INDERAL) 10 MG tablet Take 1 tablet by mouth 2 (Two) Times a Day As Needed (anxiety). 60 tablet 2   • sertraline (ZOLOFT) 100 MG tablet Take 2 tablets by mouth Every Night. 60 tablet 2     No current facility-administered medications for this visit.         Objective   Vital Signs:   /78   Pulse 95   Ht 157.5 cm (62\")   Wt 115 kg (254 lb)   BMI 46.46 kg/m²     Physical Exam  Vitals and nursing note reviewed.   Constitutional:       Appearance: Normal appearance. She is " well-developed. She is obese.   Musculoskeletal:         General: Normal range of motion.   Skin:     General: Skin is warm and dry.   Neurological:      Mental Status: She is alert and oriented to person, place, and time.   Psychiatric:         Attention and Perception: Attention normal.         Mood and Affect: Mood normal.         Speech: Speech normal.         Behavior: Behavior normal. Behavior is cooperative.         Thought Content: Thought content normal.         Cognition and Memory: Cognition normal.         Judgment: Judgment normal.        Result Review :      Assessment and Plan    Problem List Items Addressed This Visit    None     Visit Diagnoses     ADHD (attention deficit hyperactivity disorder), inattentive type    -  Primary    Relevant Medications    busPIRone (BUSPAR) 7.5 MG tablet    sertraline (ZOLOFT) 100 MG tablet    nortriptyline (Pamelor) 75 MG capsule    ERA (generalized anxiety disorder)  (Chronic)       Relevant Medications    propranolol (INDERAL) 10 MG tablet    busPIRone (BUSPAR) 7.5 MG tablet    sertraline (ZOLOFT) 100 MG tablet    nortriptyline (Pamelor) 75 MG capsule    Other insomnia        Relevant Medications    nortriptyline (Pamelor) 75 MG capsule    Major depressive disorder with single episode, in partial remission (HCC)  (Chronic)       Relevant Medications    busPIRone (BUSPAR) 7.5 MG tablet    sertraline (ZOLOFT) 100 MG tablet    nortriptyline (Pamelor) 75 MG capsule          Mental Status Exam:   Hygiene:   good  Cooperation:  Cooperative  Eye Contact:  Good  Psychomotor Behavior:  Appropriate  Affect:  Appropriate  Mood: normal  Speech:  Normal  Thought Process:  Goal directed and Linear  Thought Content:  Normal  Suicidal:  None  Homicidal:  None  Hallucinations:  None  Delusion:  None  Memory:  Intact  Orientation:  Person, Place, Time and Situation  Reliability:  good  Insight:  Good  Judgement:  Good  Impulse Control:  Good  Physical/Medical Issues:  Yes Obesity  per BMI       PHQ-9 Score:   PHQ-9 Total Score: 10     Impression/Plan:  -This is a follow up and medication check.  Jeanne reports moderate symptoms of depression and mild anxiety. Her sleep has been interrupted and she feels an increase in Pamelor will be helpful. She and I discussed possible medication adjustments which may improve her mood including increasing Zoloft to 200 mg, adding a low dose Wellbutrin, or adding Lamotrigine. I explained the mechanism of action and purpose of Lamotrigine and she feels it sounds helpful but does not wish to add a medication to her regiment at this time. She would like to increase Zoloft and Pamelor.   -Increase Pamelor to 75 mg nightly for insomnia.    -Increase Zoloft to 200 mg nightly for depression and anxiety.    -Continue Adderall XR 15 mg daily for ADHD symptoms.    Patient has refills.  -Continue BuSpar 7.5 mg twice daily for anxiety.  -Continue propranolol 10 mg twice daily as needed for anxiety.  -The SILVANA report, reviewed through PDMP, of the past 12 months were reviewed and is appropriate.  The patient/guardian reports taking the medication only as prescribed.  The patient/guardian denies any abuse or misuse of the medication.  The patient/guardian denies any other substance use or issues.  There are no apparent substance related issues.  The patient reports no side effects of the current medication usage.  The patient/guardian has reported significant improvement with medication usage and wishes to continue medication as prescribed.  The patient/guardian is appropriate to continue with current medication usage at this time.  Reinforced risks and side effects of medication usage, patient and/or guardian verbalize understanding in their own words and are in agreement with current plan.  -Last UDS on 12/01/21. Patient was THC positive but has stopped marijuana use due to taking a stimulant.     MEDS ORDERED DURING VISIT:  New Medications Ordered This Visit    Medications   • propranolol (INDERAL) 10 MG tablet     Sig: Take 1 tablet by mouth 2 (Two) Times a Day As Needed (anxiety).     Dispense:  60 tablet     Refill:  2   • busPIRone (BUSPAR) 7.5 MG tablet     Sig: Take 1 tablet by mouth 2 (Two) Times a Day.     Dispense:  60 tablet     Refill:  2   • sertraline (ZOLOFT) 100 MG tablet     Sig: Take 2 tablets by mouth Every Night.     Dispense:  60 tablet     Refill:  2   • nortriptyline (Pamelor) 75 MG capsule     Sig: Take 1 capsule by mouth Every Night.     Dispense:  30 capsule     Refill:  2       Follow Up   Return in about 2 months (around 9/12/2022) for Medication Check.  Patient was given instructions and counseling regarding her condition or for health maintenance advice. Please see specific information pulled into the AVS if appropriate.       TREATMENT PLAN/GOALS: Continue supportive psychotherapy efforts and medications as indicated. Treatment and medication options discussed during today's visit. Patient acknowledged and verbally consented to continue with current treatment plan and was educated on the importance of compliance with treatment and follow-up appointments.    MEDICATION ISSUES:  Discussed medication options and treatment plan of prescribed medication as well as the risks, benefits, and side effects including potential falls, possible impaired driving and metabolic adversities among others. Patient is agreeable to call the office with any worsening of symptoms or onset of side effects. Patient is agreeable to call 911 or go to the nearest ER should he/she begin having SI/HI.        This document has been electronically signed by ABILIO Ocasio, PMHNP-BC  July 12, 2022 08:02 EDT    Part of this note may be an electronic transcription/translation of spoken language to printed text using the Dragon Dictation System.

## 2022-08-12 DIAGNOSIS — F90.0 ADHD (ATTENTION DEFICIT HYPERACTIVITY DISORDER), INATTENTIVE TYPE: ICD-10-CM

## 2022-08-12 RX ORDER — DEXTROAMPHETAMINE SACCHARATE, AMPHETAMINE ASPARTATE MONOHYDRATE, DEXTROAMPHETAMINE SULFATE AND AMPHETAMINE SULFATE 3.75; 3.75; 3.75; 3.75 MG/1; MG/1; MG/1; MG/1
15 CAPSULE, EXTENDED RELEASE ORAL EVERY MORNING
Qty: 30 CAPSULE | Refills: 0 | OUTPATIENT
Start: 2022-08-12 | End: 2022-09-09

## 2022-08-12 NOTE — TELEPHONE ENCOUNTER
In jenns absence    Rx Refill Note  Requested Prescriptions     Pending Prescriptions Disp Refills   • amphetamine-dextroamphetamine XR (Adderall XR) 15 MG 24 hr capsule 30 capsule 0     Sig: Take 1 capsule by mouth Every Morning      Last office visit with prescribing clinician: Visit date not found      Next office visit with prescribing clinician: Visit date not found            Anyi Tony  08/12/22, 12:29 EDT

## 2022-09-09 ENCOUNTER — HOSPITAL ENCOUNTER (EMERGENCY)
Facility: HOSPITAL | Age: 32
Discharge: HOME | End: 2022-09-09
Attending: EMERGENCY MEDICINE | Admitting: EMERGENCY MEDICINE

## 2022-09-09 VITALS
OXYGEN SATURATION: 99 % | BODY MASS INDEX: 46.93 KG/M2 | HEART RATE: 117 BPM | DIASTOLIC BLOOD PRESSURE: 67 MMHG | RESPIRATION RATE: 18 BRPM | TEMPERATURE: 98.2 F | HEIGHT: 62 IN | WEIGHT: 255 LBS | SYSTOLIC BLOOD PRESSURE: 97 MMHG

## 2022-09-09 DIAGNOSIS — U07.1 COVID-19: Primary | ICD-10-CM

## 2022-09-09 LAB
ALBUMIN SERPL-MCNC: 4.4 G/DL (ref 3.5–5.2)
ALBUMIN/GLOB SERPL: 1.6 G/DL
ALP SERPL-CCNC: 103 U/L (ref 39–117)
ALT SERPL W P-5'-P-CCNC: 19 U/L (ref 1–33)
ANION GAP SERPL CALCULATED.3IONS-SCNC: 9.8 MMOL/L (ref 5–15)
AST SERPL-CCNC: 16 U/L (ref 1–32)
B PARAPERT DNA SPEC QL NAA+PROBE: NOT DETECTED
B PERT DNA SPEC QL NAA+PROBE: NOT DETECTED
BASOPHILS # BLD AUTO: 0.03 10*3/MM3 (ref 0–0.2)
BASOPHILS NFR BLD AUTO: 0.6 % (ref 0–1.5)
BILIRUB SERPL-MCNC: 0.3 MG/DL (ref 0–1.2)
BILIRUB UR QL STRIP: NEGATIVE
BUN SERPL-MCNC: 8 MG/DL (ref 6–20)
BUN/CREAT SERPL: 10.7 (ref 7–25)
C PNEUM DNA NPH QL NAA+NON-PROBE: NOT DETECTED
CALCIUM SPEC-SCNC: 9.3 MG/DL (ref 8.6–10.5)
CHLORIDE SERPL-SCNC: 98 MMOL/L (ref 98–107)
CLARITY UR: CLEAR
CO2 SERPL-SCNC: 26.2 MMOL/L (ref 22–29)
COLOR UR: YELLOW
CREAT SERPL-MCNC: 0.75 MG/DL (ref 0.57–1)
DEPRECATED RDW RBC AUTO: 38.7 FL (ref 37–54)
EGFRCR SERPLBLD CKD-EPI 2021: 107.3 ML/MIN/1.73
EOSINOPHIL # BLD AUTO: 0.1 10*3/MM3 (ref 0–0.4)
EOSINOPHIL NFR BLD AUTO: 2 % (ref 0.3–6.2)
ERYTHROCYTE [DISTWIDTH] IN BLOOD BY AUTOMATED COUNT: 12.3 % (ref 12.3–15.4)
FLUAV SUBTYP SPEC NAA+PROBE: NOT DETECTED
FLUBV RNA ISLT QL NAA+PROBE: NOT DETECTED
GLOBULIN UR ELPH-MCNC: 2.7 GM/DL
GLUCOSE SERPL-MCNC: 94 MG/DL (ref 65–99)
GLUCOSE UR STRIP-MCNC: NEGATIVE MG/DL
HADV DNA SPEC NAA+PROBE: NOT DETECTED
HCG INTACT+B SERPL-ACNC: 826.7 MIU/ML
HCOV 229E RNA SPEC QL NAA+PROBE: NOT DETECTED
HCOV HKU1 RNA SPEC QL NAA+PROBE: NOT DETECTED
HCOV NL63 RNA SPEC QL NAA+PROBE: NOT DETECTED
HCOV OC43 RNA SPEC QL NAA+PROBE: NOT DETECTED
HCT VFR BLD AUTO: 35 % (ref 34–46.6)
HGB BLD-MCNC: 11.5 G/DL (ref 12–15.9)
HGB UR QL STRIP.AUTO: NEGATIVE
HMPV RNA NPH QL NAA+NON-PROBE: NOT DETECTED
HPIV1 RNA ISLT QL NAA+PROBE: NOT DETECTED
HPIV2 RNA SPEC QL NAA+PROBE: NOT DETECTED
HPIV3 RNA NPH QL NAA+PROBE: NOT DETECTED
HPIV4 P GENE NPH QL NAA+PROBE: NOT DETECTED
IMM GRANULOCYTES # BLD AUTO: 0.03 10*3/MM3 (ref 0–0.05)
IMM GRANULOCYTES NFR BLD AUTO: 0.6 % (ref 0–0.5)
KETONES UR QL STRIP: ABNORMAL
LEUKOCYTE ESTERASE UR QL STRIP.AUTO: NEGATIVE
LIPASE SERPL-CCNC: 19 U/L (ref 13–60)
LYMPHOCYTES # BLD AUTO: 0.48 10*3/MM3 (ref 0.7–3.1)
LYMPHOCYTES NFR BLD AUTO: 9.4 % (ref 19.6–45.3)
M PNEUMO IGG SER IA-ACNC: NOT DETECTED
MCH RBC QN AUTO: 28.5 PG (ref 26.6–33)
MCHC RBC AUTO-ENTMCNC: 32.9 G/DL (ref 31.5–35.7)
MCV RBC AUTO: 86.8 FL (ref 79–97)
MONOCYTES # BLD AUTO: 0.44 10*3/MM3 (ref 0.1–0.9)
MONOCYTES NFR BLD AUTO: 8.6 % (ref 5–12)
NEUTROPHILS NFR BLD AUTO: 4.01 10*3/MM3 (ref 1.7–7)
NEUTROPHILS NFR BLD AUTO: 78.8 % (ref 42.7–76)
NITRITE UR QL STRIP: NEGATIVE
NRBC BLD AUTO-RTO: 0 /100 WBC (ref 0–0.2)
PH UR STRIP.AUTO: 5.5 [PH] (ref 5–8)
PLATELET # BLD AUTO: 241 10*3/MM3 (ref 140–450)
PMV BLD AUTO: 9.1 FL (ref 6–12)
POTASSIUM SERPL-SCNC: 3.6 MMOL/L (ref 3.5–5.2)
PROT SERPL-MCNC: 7.1 G/DL (ref 6–8.5)
PROT UR QL STRIP: NEGATIVE
RBC # BLD AUTO: 4.03 10*6/MM3 (ref 3.77–5.28)
RHINOVIRUS RNA SPEC NAA+PROBE: NOT DETECTED
RSV RNA NPH QL NAA+NON-PROBE: NOT DETECTED
SARS-COV-2 RNA NPH QL NAA+NON-PROBE: DETECTED
SODIUM SERPL-SCNC: 134 MMOL/L (ref 136–145)
SP GR UR STRIP: 1.01 (ref 1–1.03)
UROBILINOGEN UR QL STRIP: ABNORMAL
WBC NRBC COR # BLD: 5.09 10*3/MM3 (ref 3.4–10.8)

## 2022-09-09 PROCEDURE — 93005 ELECTROCARDIOGRAM TRACING: CPT | Performed by: NURSE PRACTITIONER

## 2022-09-09 PROCEDURE — 80053 COMPREHEN METABOLIC PANEL: CPT | Performed by: NURSE PRACTITIONER

## 2022-09-09 PROCEDURE — 81003 URINALYSIS AUTO W/O SCOPE: CPT | Performed by: NURSE PRACTITIONER

## 2022-09-09 PROCEDURE — 99284 EMERGENCY DEPT VISIT MOD MDM: CPT

## 2022-09-09 PROCEDURE — 0202U NFCT DS 22 TRGT SARS-COV-2: CPT | Performed by: NURSE PRACTITIONER

## 2022-09-09 PROCEDURE — 83690 ASSAY OF LIPASE: CPT | Performed by: NURSE PRACTITIONER

## 2022-09-09 PROCEDURE — 84702 CHORIONIC GONADOTROPIN TEST: CPT | Performed by: NURSE PRACTITIONER

## 2022-09-09 PROCEDURE — 85025 COMPLETE CBC W/AUTO DIFF WBC: CPT | Performed by: NURSE PRACTITIONER

## 2022-09-09 PROCEDURE — 99283 EMERGENCY DEPT VISIT LOW MDM: CPT

## 2022-09-09 RX ORDER — SODIUM CHLORIDE 0.9 % (FLUSH) 0.9 %
10 SYRINGE (ML) INJECTION AS NEEDED
Status: DISCONTINUED | OUTPATIENT
Start: 2022-09-09 | End: 2022-09-10 | Stop reason: HOSPADM

## 2022-09-09 RX ADMIN — SODIUM CHLORIDE 1000 ML: 9 INJECTION, SOLUTION INTRAVENOUS at 20:32

## 2022-09-09 NOTE — ED NOTES
Pt states that she is around 5 weeks pregnant. Pt states she took an at home Covid test today and it came back positive.

## 2022-09-10 NOTE — ED PROVIDER NOTES
Subjective   PIT    History of Present Illness  Is a 32-year-old female who comes in today complaining of head congestion and dizziness.  She reports that she tested positive for COVID after having symptoms for about a week.  She states she came home from work today and felt lightheaded.  She also reports she is about 5 weeks pregnant.  She states that she sat down for a few minutes and thought she was going to pass out.  She states that she just feels weak now but is able to move around.  She denies any abdominal pain, vaginal discharge, or dysuria.  She is a G8, P4 AB 3  Review of Systems   HENT: Positive for congestion.    Eyes: Negative.    Respiratory: Negative.    Cardiovascular: Negative.    Gastrointestinal: Negative.    Genitourinary: Negative.    Skin: Negative.    Neurological: Positive for weakness.   Psychiatric/Behavioral: Negative.        Past Medical History:   Diagnosis Date   • ADHD (attention deficit hyperactivity disorder)        No Known Allergies    Past Surgical History:   Procedure Laterality Date   • TONSILLECTOMY     • WISDOM TOOTH EXTRACTION         Family History   Problem Relation Age of Onset   • No Known Problems Mother    • No Known Problems Father    • ADD / ADHD Son    • ADD / ADHD Son        Social History     Socioeconomic History   • Marital status: Single   Tobacco Use   • Smoking status: Never Smoker   • Smokeless tobacco: Never Used   Vaping Use   • Vaping Use: Never used   Substance and Sexual Activity   • Alcohol use: No   • Drug use: No   • Sexual activity: Defer           Objective   Physical Exam  Vitals and nursing note reviewed.   Constitutional:       Appearance: Normal appearance. She is obese.   Neurological:      Mental Status: She is alert.     GEN: No acute distress  Head: Normocephalic, atraumatic  Eyes: Pupils equal round reactive to light  ENT: Posterior pharynx normal in appearance, oral mucosa is moist  Chest: Nontender to palpation  Cardiovascular: Regular  rate  Lungs: Clear to auscultation bilaterally  Abdomen: Soft, nontender, nondistended, no peritoneal signs  Extremities: No edema, normal appearance  Neuro: GCS 15  Psych: Mood and affect are appropriate      Procedures           ED Course  ED Course as of 09/09/22 2213   Fri Sep 09, 2022   2010 EKG interpreted by me: Sinus tachycardia, no acute ST elevations, some nonspecific T waves, this is an abnormal EKG [MP]   2208 Feeling better after fluids.  I have discussed the findings with the patient.  Have advised her to follow-up with her primary care in the next 24 to 48 hours.  I given her strict return to care precautions and she is agreeable to this plan of care. [TW]      ED Course User Index  [MP] Ferdinand Riggs MD  [TW] Farheen Mckeon, ABILIO                                           MDM  Number of Diagnoses or Management Options     Amount and/or Complexity of Data Reviewed  Clinical lab tests: ordered and reviewed  Review and summarize past medical records: yes  Discuss the patient with other providers: yes  Independent visualization of images, tracings, or specimens: yes    Risk of Complications, Morbidity, and/or Mortality  Presenting problems: moderate  Diagnostic procedures: moderate  Management options: moderate        Final diagnoses:   COVID-19       ED Disposition  ED Disposition     ED Disposition   Discharge    Condition   Stable    Comment   --             Provider, No Known  [facility] [address] [phone]          Latisha Madsen MD  [address]  [id number]  [facility] [address] [phone]    Schedule an appointment as soon as possible for a visit            Medication List      Stop    amphetamine-dextroamphetamine XR 15 MG 24 hr capsule  Commonly known as: Adderall XR     nortriptyline 75 MG capsule  Commonly known as: Pamelor     propranolol 10 MG tablet  Commonly known as: INDERAL             Farheen Mckeon APRN  09/09/22 2213

## 2023-02-16 ENCOUNTER — TRANSCRIBE ORDERS (OUTPATIENT)
Dept: DIABETES SERVICES | Facility: HOSPITAL | Age: 33
End: 2023-02-16
Payer: COMMERCIAL

## 2023-02-16 DIAGNOSIS — O24.410 DIET CONTROLLED GESTATIONAL DIABETES MELLITUS (GDM), ANTEPARTUM: Primary | ICD-10-CM

## 2023-10-21 NOTE — PROGRESS NOTES
"Chief Complaint  Depression, anxiety, insomnia, and ADHD, inattentive type      Subjective          Jeanne Jack presents to [facility] by herself for a follow up and medication check.    History of Present Illness: Jeanne states, \"I had a baby. She's 5 months now, but that's the reason I stopped all my meds.\" Jeanne tells me she is experiencing symptoms of depression and anxiety since no longer taking medication. She reports moderate symptoms of depression with depressed mood, anhedonia, difficulty with sleep, fatigue, increased appetite, feelings of guilt, decreased concentration, and psychomotor retardation.  She adamantly denies any thoughts of suicide.  She also reports symptoms of severe anxiety with feeling constantly nervous every day.  She worries about a lot of different things like her children and work.  She has difficulty controlling her worry.  She has difficulty relaxing and often feels restless and fidgety.  She also will begin sweating when she is extremely nervous and she uses a change in her voice when she is talking with others.  She tells me she is really struggling with social anxiety and being around others.  She would like to stay in the office setting at work but has even considered going mostly.  She notices that it is difficult to concentrate with depression but also feels that she needs to have her ADHD symptoms treated.  She is forgetful, misplaces everything, has a difficult time staying on task and completing task, and has a difficult time deciding what to do first.  Her OB had recommended resuming Zoloft but she wanted to discuss with her previous psychiatric medication provider.  She is using Unisom to sleep but has to take 2 tablets at night for it to work.  She has been overeating, especially at night.  She also finds herself overeating when she is around others due to anxiety.  She identifies her fiancé as a good support system and he often " "helps with the children when needed.  She is no longer breast-feeding her infant daughter.  She now has 3 sons and 2 daughters.  Two of her children have been diagnosed with ADHD.  Two of her children are in physical therapy right now.  She denies any SI/HI/AVH.    Social History: Jeanne currently lives in [place], [place] with her fiancé and 5 children.  She has returned to work after having her last child.  She is engaged to be .  She is not in college courses at this time.  She enjoys spending time with her family and watching TV.  She occasionally uses cannabis but denies the use of any nicotine, alcohol, or recreational drugs.    Current Medications:   Current Outpatient Medications   Medication Sig Dispense Refill    ibuprofen (ADVIL,MOTRIN) 800 MG tablet Take 1 tablet 3 times a day by oral route for 10 days.      propranolol (INDERAL) 10 MG tablet Take 1 tablet by mouth 3 (Three) Times a Day As Needed (anxiety). 90 tablet 2    traZODone (DESYREL) 50 MG tablet Take 1 tablet by mouth Every Night. 30 tablet 2    Vortioxetine HBr (TRINTELLIX) 10 MG tablet tablet Take 1 tablet by mouth Daily With Breakfast. 30 tablet 2    Vortioxetine HBr (Trintellix) 5 MG tablet tablet Take 1 tablet by mouth Daily With Breakfast. 14 tablet 0     No current facility-administered medications for this visit.         Objective   Vital Signs:   /78   Pulse 88   Ht 157.5 cm (62\")   Wt 106 kg (234 lb)   SpO2 99%   BMI 42.80 kg/m²     Physical Exam  Vitals and nursing note reviewed.   Constitutional:       Appearance: Normal appearance. She is well-developed. She is obese.   Musculoskeletal:         General: Normal range of motion.   Skin:     General: Skin is warm and dry.   Neurological:      General: No focal deficit present.      Mental Status: She is alert and oriented to person, place, and time.   Psychiatric:         Attention and Perception: Attention normal.         Mood and Affect: Mood normal. Mood is " anxious and depressed. Tearful: restricted to hold back tears.         Speech: Speech normal.         Behavior: Behavior normal. Hyperactive: restless. Behavior is cooperative.         Thought Content: Thought content normal.         Cognition and Memory: Cognition normal.         Judgment: Judgment normal.        Result Review :      Assessment and Plan    Problem List Items Addressed This Visit    None  Visit Diagnoses       ERA (generalized anxiety disorder)  (Chronic)   -  Primary    Relevant Medications    Vortioxetine HBr (Trintellix) 5 MG tablet tablet    Vortioxetine HBr (TRINTELLIX) 10 MG tablet tablet    traZODone (DESYREL) 50 MG tablet    propranolol (INDERAL) 10 MG tablet    Current moderate episode of major depressive disorder, unspecified whether recurrent  (Chronic)       Relevant Medications    Vortioxetine HBr (Trintellix) 5 MG tablet tablet    Vortioxetine HBr (TRINTELLIX) 10 MG tablet tablet    traZODone (DESYREL) 50 MG tablet    Other insomnia  (Chronic)       Relevant Medications    traZODone (DESYREL) 50 MG tablet    ADHD (attention deficit hyperactivity disorder), inattentive type  (Chronic)       Relevant Medications    Vortioxetine HBr (Trintellix) 5 MG tablet tablet    Vortioxetine HBr (TRINTELLIX) 10 MG tablet tablet    traZODone (DESYREL) 50 MG tablet    Social anxiety disorder        Relevant Medications    Vortioxetine HBr (Trintellix) 5 MG tablet tablet    Vortioxetine HBr (TRINTELLIX) 10 MG tablet tablet    traZODone (DESYREL) 50 MG tablet              Mental Status Exam:   Hygiene:   good  Cooperation:  Cooperative  Eye Contact:  Good  Psychomotor Behavior:  Restless  Affect:  Restricted  Mood: depressed and anxious  Speech:  Normal  Thought Process:  Goal directed and Linear  Thought Content:  Normal  Suicidal:  None  Homicidal:  None  Hallucinations:  None  Delusion:  None  Memory:  Intact  Orientation:  Person, Place, Time and Situation  Reliability:  good  Insight:   Good  Judgement:  Good  Impulse Control:  Good  Physical/Medical Issues:  Yes Obesity per BMI        PHQ-9 Score:   PHQ-9 Total Score: 20     Impression/Plan:  -This is a follow up and medication check.  Jeanne reports an exacerbation of depression and anxiety since having her last child and no longer taking psychiatric medications.  She is no longer breast-feeding and has returned to work.  She finds herself to be overwhelmed at times and have a difficult time returning to work after the birth of her last child.  She has considered going remote for work but would like to try to stay in the office if possible.  She tells me she is worried about everything and has difficulty controlling her worry.  She reports her last depressive episode as being last Saturday.  Her OB suggested that she resume Zoloft but she did not feel it was beneficial so she stopped.  She has goals to work on anxiety and mood before treating ADHD symptoms but she is struggling at both work and home with executive dysfunction.  Today, Jeanne and I talked about alternatives to Zoloft.  She had tried Prozac, Lexapro, Zoloft, and Wellbutrin in the past.  For her, I suggested we try a medication like Cymbalta or Effexor or Trintellix.  I explained the mechanism of action, purpose, risk, benefits, and potential adverse effects of each medication.  She believes she would like to try Trintellix but does have concerns about the nausea.  I will start at the 5 mg dose and she is aware she can use Zofran if needed.  We also discussed trying trazodone again for insomnia and propranolol for anxiety.  She agrees to both of these medications and we discussed the potential adverse effects like orthostatic hypotension with propranolol and dry mouth with trazodone.  She is aware that all medications have the potential to worsen mood and she will notify provider or go to the nearest emergency room or call [phone] if she is experiencing suicidal ideations.  In the  future, we will work on executive dysfunction by treating ADHD.  -Stop Pamelor, Zoloft, Adderall XR, and BuSpar since patient is no longer taking.  -Initiate Trintellix 5 mg daily for 2 weeks with food then increase to 10 mg daily with food for depression and anxiety.  I provided #14 samples of the 5 mg dose.  -Initiate trazodone 50 mg nightly for insomnia.  -Resume propranolol 10 mg 3 times daily as needed for anxiety.  -The SILVANA report, reviewed through PDMP, of the past 12 months were reviewed and is appropriate.  The patient/guardian reports taking the medication only as prescribed.  The patient/guardian denies any abuse or misuse of the medication.  The patient/guardian denies any other substance use or issues.  There are no apparent substance related issues.  The patient reports no side effects of the current medication usage.  The patient/guardian has reported significant improvement with medication usage and wishes to continue medication as prescribed.  The patient/guardian is appropriate to continue with current medication usage at this time.  Reinforced risks and side effects of medication usage, patient and/or guardian verbalize understanding in their own words and are in agreement with current plan.  -Last UDS on 04/23/23. Negative for all substance.    MEDS ORDERED DURING VISIT:  New Medications Ordered This Visit   Medications    Vortioxetine HBr (Trintellix) 5 MG tablet tablet     Sig: Take 1 tablet by mouth Daily With Breakfast.     Dispense:  14 tablet     Refill:  0     Order Specific Question:   Lot Number?     Answer:   [id number]     Order Specific Question:   Expiration Date?     Answer:   5/1/2025     Order Specific Question:   ?     Answer:   Other  [35]     Order Specific Question:   Quantity     Answer:   14    Vortioxetine HBr (TRINTELLIX) 10 MG tablet tablet     Sig: Take 1 tablet by mouth Daily With Breakfast.     Dispense:  30 tablet     Refill:  2    traZODone  (DESYREL) 50 MG tablet     Sig: Take 1 tablet by mouth Every Night.     Dispense:  30 tablet     Refill:  2    propranolol (INDERAL) 10 MG tablet     Sig: Take 1 tablet by mouth 3 (Three) Times a Day As Needed (anxiety).     Dispense:  90 tablet     Refill:  2       Follow Up   Return in about 6 weeks (around 12/4/2023) for Medication Check.  Patient was given instructions and counseling regarding her condition or for health maintenance advice. Please see specific information pulled into the AVS if appropriate.       TREATMENT PLAN/GOALS: Continue supportive psychotherapy efforts and medications as indicated. Treatment and medication options discussed during today's visit. Patient acknowledged and verbally consented to continue with current treatment plan and was educated on the importance of compliance with treatment and follow-up appointments.    MEDICATION ISSUES:  Discussed medication options and treatment plan of prescribed medication as well as the risks, benefits, and side effects including potential falls, possible impaired driving and metabolic adversities among others. Patient is agreeable to call the office with any worsening of symptoms or onset of side effects. Patient is agreeable to call 911 or go to the nearest ER should he/she begin having SI/HI.        This document has been electronically signed by ABILIO Ocasio, PMHNP-BC  October 23, 2023 09:35 EDT    Part of this note may be an electronic transcription/translation of spoken language to printed text using the Dragon Dictation System.

## 2023-10-23 ENCOUNTER — OFFICE VISIT (OUTPATIENT)
Dept: PSYCHIATRY | Facility: CLINIC | Age: 33
End: 2023-10-23
Payer: COMMERCIAL

## 2023-10-23 VITALS
DIASTOLIC BLOOD PRESSURE: 78 MMHG | HEIGHT: 62 IN | OXYGEN SATURATION: 99 % | BODY MASS INDEX: 43.06 KG/M2 | WEIGHT: 234 LBS | SYSTOLIC BLOOD PRESSURE: 112 MMHG | HEART RATE: 88 BPM

## 2023-10-23 DIAGNOSIS — F90.0 ADHD (ATTENTION DEFICIT HYPERACTIVITY DISORDER), INATTENTIVE TYPE: Chronic | ICD-10-CM

## 2023-10-23 DIAGNOSIS — F40.10 SOCIAL ANXIETY DISORDER: ICD-10-CM

## 2023-10-23 DIAGNOSIS — G47.09 OTHER INSOMNIA: Chronic | ICD-10-CM

## 2023-10-23 DIAGNOSIS — F41.1 GAD (GENERALIZED ANXIETY DISORDER): Primary | Chronic | ICD-10-CM

## 2023-10-23 DIAGNOSIS — F32.1 CURRENT MODERATE EPISODE OF MAJOR DEPRESSIVE DISORDER, UNSPECIFIED WHETHER RECURRENT: Chronic | ICD-10-CM

## 2023-10-23 PROCEDURE — 90792 PSYCH DIAG EVAL W/MED SRVCS: CPT | Performed by: NURSE PRACTITIONER

## 2023-10-23 PROCEDURE — 1160F RVW MEDS BY RX/DR IN RCRD: CPT | Performed by: NURSE PRACTITIONER

## 2023-10-23 PROCEDURE — 1159F MED LIST DOCD IN RCRD: CPT | Performed by: NURSE PRACTITIONER

## 2023-10-23 RX ORDER — IBUPROFEN 800 MG/1
TABLET ORAL
COMMUNITY

## 2023-10-23 RX ORDER — PROPRANOLOL HYDROCHLORIDE 10 MG/1
10 TABLET ORAL 3 TIMES DAILY PRN
Qty: 90 TABLET | Refills: 2 | Status: SHIPPED | OUTPATIENT
Start: 2023-10-23

## 2023-10-23 RX ORDER — TRAZODONE HYDROCHLORIDE 50 MG/1
50 TABLET ORAL NIGHTLY
Qty: 30 TABLET | Refills: 2 | Status: SHIPPED | OUTPATIENT
Start: 2023-10-23

## 2023-10-23 RX ORDER — SERTRALINE HYDROCHLORIDE 100 MG/1
TABLET, FILM COATED ORAL
COMMUNITY
End: 2023-10-23

## 2023-12-11 NOTE — PROGRESS NOTES
"Chief Complaint  Depression, anxiety, insomnia, and ADHD, inattentive type      Subjective          Jeanne Jack presents to [facility] by herself for a follow up and medication check.    History of Present Illness: Jeanne states, \"I felt a lot better with the Trintellix.\" Jeanne tells me her mood and anxiety have both improved since starting the Trintellix. She has been nauseated as expected, but believes it is more because she is not used to eating first thing in the morning. She noticed a minor difference in sleep with Trazodone and had nights where she needed to use Unisom and Trazodone to sleep. She found Propranolol helpful for anxiety. Work has been better. She was ill this week, but is feeling better. She continues to eat more in times of stress.  She is taking Propranolol, Trazodone, and Trintellix. She denies any side effects. She denies any SI/HI/AVH.    Social History: Jeanne currently lives in [place], [place] with her fiancé and 5 children.  She has returned to work after having her last child.  She is engaged to be .  She is not in college courses at this time.  She enjoys spending time with her family and watching TV.  She occasionally uses cannabis but denies the use of any nicotine, alcohol, or recreational drugs.    Current Medications:   Current Outpatient Medications   Medication Sig Dispense Refill    ibuprofen (ADVIL,MOTRIN) 800 MG tablet Take 1 tablet 3 times a day by oral route for 10 days.      propranolol (INDERAL) 10 MG tablet Take 1 tablet by mouth 3 (Three) Times a Day As Needed (anxiety). 90 tablet 2    traZODone (DESYREL) 100 MG tablet Take 1 tablet by mouth Every Night. 30 tablet 2    Vortioxetine HBr (TRINTELLIX) 10 MG tablet tablet Take 1 tablet by mouth Daily With Breakfast. 30 tablet 2    Vortioxetine HBr (Trintellix) 5 MG tablet tablet Take 1 tablet by mouth Daily With Breakfast. 14 tablet 0     No current facility-administered " "medications for this visit.         Objective   Vital Signs:   /74   Pulse 82   Ht 157.5 cm (62\")   Wt 109 kg (240 lb)   SpO2 98%   BMI 43.90 kg/m²     Physical Exam  Vitals and nursing note reviewed.   Constitutional:       Appearance: Normal appearance. She is well-developed. She is obese.   Musculoskeletal:         General: Normal range of motion.   Skin:     General: Skin is warm and dry.   Neurological:      General: No focal deficit present.      Mental Status: She is alert and oriented to person, place, and time.   Psychiatric:         Attention and Perception: Attention normal.         Mood and Affect: Mood and affect normal. Tearful: restricted to hold back tears.         Speech: Speech normal.         Behavior: Behavior normal. Hyperactive: restless. Behavior is cooperative.         Thought Content: Thought content normal.         Cognition and Memory: Cognition normal.         Judgment: Judgment normal.        Result Review :      Assessment and Plan    Problem List Items Addressed This Visit    None  Visit Diagnoses       ERA (generalized anxiety disorder)  (Chronic)   -  Primary    Relevant Medications    traZODone (DESYREL) 100 MG tablet    Vortioxetine HBr (Trintellix) 5 MG tablet tablet    Current moderate episode of major depressive disorder, unspecified whether recurrent  (Chronic)       Relevant Medications    traZODone (DESYREL) 100 MG tablet    Vortioxetine HBr (Trintellix) 5 MG tablet tablet    Other insomnia  (Chronic)       Relevant Medications    traZODone (DESYREL) 100 MG tablet    ADHD (attention deficit hyperactivity disorder), inattentive type  (Chronic)       Relevant Medications    traZODone (DESYREL) 100 MG tablet    Vortioxetine HBr (Trintellix) 5 MG tablet tablet    Social anxiety disorder  (Chronic)       Relevant Medications    traZODone (DESYREL) 100 MG tablet    Vortioxetine HBr (Trintellix) 5 MG tablet tablet                Mental Status Exam:   Hygiene:   " good  Cooperation:  Cooperative  Eye Contact:  Good  Psychomotor Behavior:  Appropriate  Affect:  Appropriate  Mood: normal  Speech:  Normal  Thought Process:  Goal directed and Linear  Thought Content:  Normal  Suicidal:  None  Homicidal:  None  Hallucinations:  None  Delusion:  None  Memory:  Intact  Orientation:  Person, Place, Time and Situation  Reliability:  good  Insight:  Good  Judgement:  Good  Impulse Control:  Good  Physical/Medical Issues:  Yes Obesity per BMI        PHQ-9 Score:   PHQ-9 Total Score: 14     Impression/Plan:  -This is a follow up and medication check.  Jeanne reports improved depression and anxiety. She has some nausea so we discussed moving the time of administration to later in the day with food around noon. She is not finding Trazodone to be helpful for sleep, but is willing to try in increase in the dose. She is using Propranolol as needed. She is motivated to continue to make medication adjustments to improve mood, anxiety, and sleep. We may focus on treating ADHD at next visit.   -Increase Trintellix to 15 mg daily with food for depression and anxiety. Patient has 10 mg dose, so I provided #14 samples of 5 mg.     -Increase trazodone to 100 mg nightly for insomnia.  -Continue propranolol 10 mg 3 times daily as needed for anxiety. Patient has refills.   -The SILVANA report, reviewed through PDMP, of the past 12 months were reviewed and is appropriate.  The patient/guardian reports taking the medication only as prescribed.  The patient/guardian denies any abuse or misuse of the medication.  The patient/guardian denies any other substance use or issues.  There are no apparent substance related issues.  The patient reports no side effects of the current medication usage.  The patient/guardian has reported significant improvement with medication usage and wishes to continue medication as prescribed.  The patient/guardian is appropriate to continue with current medication usage at this  time.  Reinforced risks and side effects of medication usage, patient and/or guardian verbalize understanding in their own words and are in agreement with current plan.  -Last UDS on 04/23/23. Negative for all substance.    MEDS ORDERED DURING VISIT:  New Medications Ordered This Visit   Medications    traZODone (DESYREL) 100 MG tablet     Sig: Take 1 tablet by mouth Every Night.     Dispense:  30 tablet     Refill:  2    Vortioxetine HBr (Trintellix) 5 MG tablet tablet     Sig: Take 1 tablet by mouth Daily With Breakfast.     Dispense:  14 tablet     Refill:  0     Order Specific Question:   Lot Number?     Answer:   [id number]     Order Specific Question:   Expiration Date?     Answer:   6/30/2025     Order Specific Question:   Quantity     Answer:   14       Follow Up   Return in about 2 months (around 2/14/2024) for Medication Check.  Patient was given instructions and counseling regarding her condition or for health maintenance advice. Please see specific information pulled into the AVS if appropriate.       TREATMENT PLAN/GOALS: Continue supportive psychotherapy efforts and medications as indicated. Treatment and medication options discussed during today's visit. Patient acknowledged and verbally consented to continue with current treatment plan and was educated on the importance of compliance with treatment and follow-up appointments.    MEDICATION ISSUES:  Discussed medication options and treatment plan of prescribed medication as well as the risks, benefits, and side effects including potential falls, possible impaired driving and metabolic adversities among others. Patient is agreeable to call the office with any worsening of symptoms or onset of side effects. Patient is agreeable to call 911 or go to the nearest ER should he/she begin having SI/HI.        This document has been electronically signed by ABILIO Ocasio, PMHNP-BC  December 14, 2023 08:51 EST    Part of this note may be an  electronic transcription/translation of spoken language to printed text using the Dragon Dictation System.

## 2023-12-14 ENCOUNTER — OFFICE VISIT (OUTPATIENT)
Dept: PSYCHIATRY | Facility: CLINIC | Age: 33
End: 2023-12-14
Payer: COMMERCIAL

## 2023-12-14 VITALS
HEIGHT: 62 IN | BODY MASS INDEX: 44.16 KG/M2 | OXYGEN SATURATION: 98 % | DIASTOLIC BLOOD PRESSURE: 74 MMHG | WEIGHT: 240 LBS | SYSTOLIC BLOOD PRESSURE: 112 MMHG | HEART RATE: 82 BPM

## 2023-12-14 DIAGNOSIS — F40.10 SOCIAL ANXIETY DISORDER: Chronic | ICD-10-CM

## 2023-12-14 DIAGNOSIS — F32.1 CURRENT MODERATE EPISODE OF MAJOR DEPRESSIVE DISORDER, UNSPECIFIED WHETHER RECURRENT: Chronic | ICD-10-CM

## 2023-12-14 DIAGNOSIS — F41.1 GAD (GENERALIZED ANXIETY DISORDER): Primary | Chronic | ICD-10-CM

## 2023-12-14 DIAGNOSIS — G47.09 OTHER INSOMNIA: Chronic | ICD-10-CM

## 2023-12-14 DIAGNOSIS — F90.0 ADHD (ATTENTION DEFICIT HYPERACTIVITY DISORDER), INATTENTIVE TYPE: Chronic | ICD-10-CM

## 2023-12-14 PROCEDURE — 1160F RVW MEDS BY RX/DR IN RCRD: CPT | Performed by: NURSE PRACTITIONER

## 2023-12-14 PROCEDURE — 99214 OFFICE O/P EST MOD 30 MIN: CPT | Performed by: NURSE PRACTITIONER

## 2023-12-14 PROCEDURE — 1159F MED LIST DOCD IN RCRD: CPT | Performed by: NURSE PRACTITIONER

## 2023-12-14 RX ORDER — TRAZODONE HYDROCHLORIDE 100 MG/1
100 TABLET ORAL NIGHTLY
Qty: 30 TABLET | Refills: 2 | Status: SHIPPED | OUTPATIENT
Start: 2023-12-14

## 2024-02-06 DIAGNOSIS — F32.1 CURRENT MODERATE EPISODE OF MAJOR DEPRESSIVE DISORDER, UNSPECIFIED WHETHER RECURRENT: Chronic | ICD-10-CM

## 2024-02-06 DIAGNOSIS — G47.09 OTHER INSOMNIA: Chronic | ICD-10-CM

## 2024-02-06 DIAGNOSIS — F41.1 GAD (GENERALIZED ANXIETY DISORDER): Chronic | ICD-10-CM

## 2024-02-06 RX ORDER — PROPRANOLOL HYDROCHLORIDE 10 MG/1
10 TABLET ORAL 3 TIMES DAILY PRN
Qty: 90 TABLET | Refills: 2 | Status: SHIPPED | OUTPATIENT
Start: 2024-02-06

## 2024-02-06 RX ORDER — IBUPROFEN 600 MG/1
TABLET ORAL
COMMUNITY
Start: 2024-02-05

## 2024-02-06 RX ORDER — TRAZODONE HYDROCHLORIDE 100 MG/1
100 TABLET ORAL NIGHTLY
Qty: 30 TABLET | Refills: 2 | Status: SHIPPED | OUTPATIENT
Start: 2024-02-06

## 2024-02-06 NOTE — TELEPHONE ENCOUNTER
Rx Refill Note  Requested Prescriptions     Pending Prescriptions Disp Refills    propranolol (INDERAL) 10 MG tablet 90 tablet 2     Sig: Take 1 tablet by mouth 3 (Three) Times a Day As Needed (anxiety).    traZODone (DESYREL) 100 MG tablet 30 tablet 2     Sig: Take 1 tablet by mouth Every Night.    Vortioxetine HBr (TRINTELLIX) 10 MG tablet tablet 30 tablet 2     Sig: Take 1 tablet by mouth Daily With Breakfast.    Vortioxetine HBr (Trintellix) 5 MG tablet tablet 30 tablet 0     Sig: Take 1 tablet by mouth Daily With Breakfast.      Last office visit with prescribing clinician: 12/14/2023   Last telemedicine visit with prescribing clinician: Visit date not found   Next office visit with prescribing clinician: 3/27/2024                         Would you like a call back once the refill request has been completed: [] Yes [] No    If the office needs to give you a call back, can they leave a voicemail: [] Yes [] No    Amisha Rolle, [facility]  02/06/24, 07:34 EST

## 2024-02-07 DIAGNOSIS — F32.1 CURRENT MODERATE EPISODE OF MAJOR DEPRESSIVE DISORDER, UNSPECIFIED WHETHER RECURRENT: Chronic | ICD-10-CM

## 2024-02-07 DIAGNOSIS — F41.1 GAD (GENERALIZED ANXIETY DISORDER): Chronic | ICD-10-CM

## 2024-02-07 RX ORDER — VORTIOXETINE 10 MG/1
10 TABLET, FILM COATED ORAL
Qty: 30 TABLET | Refills: 2 | OUTPATIENT
Start: 2024-02-07

## 2024-03-15 DIAGNOSIS — F32.1 CURRENT MODERATE EPISODE OF MAJOR DEPRESSIVE DISORDER, UNSPECIFIED WHETHER RECURRENT: Chronic | ICD-10-CM

## 2024-03-15 DIAGNOSIS — F41.1 GAD (GENERALIZED ANXIETY DISORDER): Chronic | ICD-10-CM

## 2024-03-15 RX ORDER — VORTIOXETINE 5 MG/1
5 TABLET, FILM COATED ORAL
Qty: 30 TABLET | Refills: 0 | Status: SHIPPED | OUTPATIENT
Start: 2024-03-15

## 2024-03-23 NOTE — PROGRESS NOTES
"This provider is located at [facility], [facility] ,[address], [address] in [place], [place],using a secure [employer] Video Visit through [employer]. Patient is being seen remotely via telehealth at their home address in [place], and stated they are in a secure environment for this session. The patient's condition being diagnosed/treated is appropriate for telemedicine. The provider identified herself as well as her credentials.   The patient, and/or patients guardian, consent to be seen remotely, and when consent is given they understand that the consent allows for patient identifiable information to be sent to a third party as needed.   They may refuse to be seen remotely at any time. The electronic data is encrypted and password protected, and the patient and/or guardian has been advised of the potential risks to privacy not withstanding such measures.     Chief Complaint  Depression, anxiety, insomnia, and ADHD, inattentive type      Subjective          Jeanne Jack presents via My[employer] Video through [employer] by herself for a follow up and medication check.    History of Present Illness: Jeanne states, \"I tried to increase Trintellix to 15 mg and the nausea is too much.\" Jeanne tells me that she would like to consider an alternative. The persistent nausea is causing her to not want to take her medication. She has not seen many changes in mood. She finds anxiety is managed. She tells me some days are better than others with anxiety. Propranolol helps. She had not been sleeping as well while her 11-month-old went through a sleep regression period. She tells me that her daughter's medical issues have also contributed to her anxiety and depression. She is back to using Trazodone more regularly and this helps. She has been stress eating and overeating. Boredom is a trigger to overeat as well. She admits to lack of motivation and energy as part of depression.  She is taking Propranolol, " Trazodone, and Trintellix. She denies any side effects. She denies any SI/HI/AVH.    Social History: Jeanne currently lives in [place], [place] with her fiancé and 5 children.  She has returned to work after having her last child.  She is engaged to be .  She is not in college courses at this time.  She enjoys spending time with her family and watching TV.  She occasionally uses cannabis but denies the use of any nicotine, alcohol, or recreational drugs.    Current Medications:   Current Outpatient Medications   Medication Sig Dispense Refill     MG tablet       propranolol (INDERAL) 10 MG tablet Take 1 tablet by mouth 3 (Three) Times a Day As Needed (anxiety). 90 tablet 2    traZODone (DESYREL) 100 MG tablet Take 1 tablet by mouth Every Night. 30 tablet 2    Trintellix 5 MG tablet tablet TAKE 1 TABLET BY MOUTH DAILY WITH BREAKFAST 30 tablet 0    escitalopram (Lexapro) 10 MG tablet Take 1 tablet by mouth Daily. 30 tablet 2     No current facility-administered medications for this visit.         Objective   Vital Signs:   There were no vitals taken for this visit.    Physical Exam  Nursing note reviewed. Vitals reviewed: No vitals due to nature of a telehealth visit.  Constitutional:       Appearance: Normal appearance. She is well-developed. She is obese.   Neurological:      General: No focal deficit present.      Mental Status: She is alert and oriented to person, place, and time.   Psychiatric:         Attention and Perception: Attention normal.         Mood and Affect: Affect normal. Mood is depressed. Tearful: restricted to hold back tears.         Speech: Speech normal.         Behavior: Behavior normal. Behavior is cooperative.         Thought Content: Thought content normal.         Cognition and Memory: Cognition normal.         Judgment: Judgment normal.     The following data was reviewed by: ABILIO Dixon on 03/27/2024:     with Fernanda Longoria APRN (03/09/2024)    Assessment and Plan    Problem List Items Addressed This Visit    None  Visit Diagnoses       Current moderate episode of major depressive disorder, unspecified whether recurrent  (Chronic)   -  Primary    Relevant Medications    escitalopram (Lexapro) 10 MG tablet    ERA (generalized anxiety disorder)  (Chronic)       Relevant Medications    escitalopram (Lexapro) 10 MG tablet    Other insomnia  (Chronic)       ADHD (attention deficit hyperactivity disorder), inattentive type  (Chronic)       Relevant Medications    escitalopram (Lexapro) 10 MG tablet    Social anxiety disorder  (Chronic)       Relevant Medications    escitalopram (Lexapro) 10 MG tablet                  Mental Status Exam:   Hygiene:   good  Cooperation:  Cooperative  Eye Contact:  Good  Psychomotor Behavior:  Appropriate  Affect:  Appropriate  Mood: depressed  Speech:  Normal  Thought Process:  Goal directed and Linear  Thought Content:  Normal  Suicidal:  None  Homicidal:  None  Hallucinations:  None  Delusion:  None  Memory:  Intact  Orientation:  Person, Place, Time and Situation  Reliability:  good  Insight:  Good  Judgement:  Good  Impulse Control:  Good  Physical/Medical Issues:  Yes Obesity per BMI        PHQ-9 Score:   PHQ-9 Total Score: (P) 16     Impression/Plan:  -This is a follow up and medication check.  Jeanne reports difficulty with Trintellix due to nausea. She would like to consider an alternative since she has not foud it to be helpful enough for depression and anxiety. She has tried Lexapro, Zoloft, and Wellbutrin. She would like to resume Lexapro. She is pleased with Trazodone and Propranolol. She is still having difficulty with executive dysfunction which contributes to anxiety related to work.   -Decrease Trintellix to 5 mg daily with food for tow weeks and then stop. Patient has refills.   -Initiate Lexapro 10 mg nightly around supper for depression and anxiety.   -Continue trazodone 100 mg nightly for insomnia. Patient  has refills.   -Continue propranolol 10 mg 3 times daily as needed for anxiety. Patient has refills.   -The SILVANA report, reviewed through PDMP, of the past 12 months were reviewed and is appropriate.  The patient/guardian reports taking the medication only as prescribed.  The patient/guardian denies any abuse or misuse of the medication.  The patient/guardian denies any other substance use or issues.  There are no apparent substance related issues.  The patient reports no side effects of the current medication usage.  The patient/guardian has reported significant improvement with medication usage and wishes to continue medication as prescribed.  The patient/guardian is appropriate to continue with current medication usage at this time.  Reinforced risks and side effects of medication usage, patient and/or guardian verbalize understanding in their own words and are in agreement with current plan.  -Last UDS on 04/23/23. Negative for all substance.    MEDS ORDERED DURING VISIT:  New Medications Ordered This Visit   Medications    escitalopram (Lexapro) 10 MG tablet     Sig: Take 1 tablet by mouth Daily.     Dispense:  30 tablet     Refill:  2       Follow Up   Return in about 6 weeks (around 5/8/2024) for Medication Check.  Patient was given instructions and counseling regarding her condition or for health maintenance advice. Please see specific information pulled into the AVS if appropriate.       TREATMENT PLAN/GOALS: Continue supportive psychotherapy efforts and medications as indicated. Treatment and medication options discussed during today's visit. Patient acknowledged and verbally consented to continue with current treatment plan and was educated on the importance of compliance with treatment and follow-up appointments.    MEDICATION ISSUES:  Discussed medication options and treatment plan of prescribed medication as well as the risks, benefits, and side effects including potential falls, possible impaired  driving and metabolic adversities among others. Patient is agreeable to call the office with any worsening of symptoms or onset of side effects. Patient is agreeable to call 911 or go to the nearest ER should he/she begin having SI/HI.        This document has been electronically signed by ABILIO Ocasio, PMHNP-BC  March 27, 2024 09:05 EDT    Part of this note may be an electronic transcription/translation of spoken language to printed text using the Dragon Dictation System.

## 2024-03-27 ENCOUNTER — TELEMEDICINE (OUTPATIENT)
Dept: PSYCHIATRY | Facility: CLINIC | Age: 34
End: 2024-03-27
Payer: COMMERCIAL

## 2024-03-27 DIAGNOSIS — F32.1 CURRENT MODERATE EPISODE OF MAJOR DEPRESSIVE DISORDER, UNSPECIFIED WHETHER RECURRENT: Primary | Chronic | ICD-10-CM

## 2024-03-27 DIAGNOSIS — F90.0 ADHD (ATTENTION DEFICIT HYPERACTIVITY DISORDER), INATTENTIVE TYPE: Chronic | ICD-10-CM

## 2024-03-27 DIAGNOSIS — F40.10 SOCIAL ANXIETY DISORDER: Chronic | ICD-10-CM

## 2024-03-27 DIAGNOSIS — G47.09 OTHER INSOMNIA: Chronic | ICD-10-CM

## 2024-03-27 DIAGNOSIS — F41.1 GAD (GENERALIZED ANXIETY DISORDER): Chronic | ICD-10-CM

## 2024-03-27 PROCEDURE — 99214 OFFICE O/P EST MOD 30 MIN: CPT | Performed by: NURSE PRACTITIONER

## 2024-03-27 PROCEDURE — 1160F RVW MEDS BY RX/DR IN RCRD: CPT | Performed by: NURSE PRACTITIONER

## 2024-03-27 PROCEDURE — 1159F MED LIST DOCD IN RCRD: CPT | Performed by: NURSE PRACTITIONER

## 2024-03-27 RX ORDER — ESCITALOPRAM OXALATE 10 MG/1
10 TABLET ORAL DAILY
Qty: 30 TABLET | Refills: 2 | Status: SHIPPED | OUTPATIENT
Start: 2024-03-27 | End: 2025-03-27

## 2024-05-09 ENCOUNTER — OFFICE VISIT (OUTPATIENT)
Dept: PSYCHIATRY | Facility: CLINIC | Age: 34
End: 2024-05-09
Payer: COMMERCIAL

## 2024-05-09 VITALS
BODY MASS INDEX: 44.9 KG/M2 | SYSTOLIC BLOOD PRESSURE: 108 MMHG | WEIGHT: 244 LBS | OXYGEN SATURATION: 98 % | HEART RATE: 76 BPM | HEIGHT: 62 IN | DIASTOLIC BLOOD PRESSURE: 74 MMHG

## 2024-05-09 DIAGNOSIS — F32.1 CURRENT MODERATE EPISODE OF MAJOR DEPRESSIVE DISORDER, UNSPECIFIED WHETHER RECURRENT: Chronic | ICD-10-CM

## 2024-05-09 DIAGNOSIS — F41.1 GAD (GENERALIZED ANXIETY DISORDER): Chronic | ICD-10-CM

## 2024-05-09 DIAGNOSIS — Z79.899 MEDICATION MANAGEMENT: ICD-10-CM

## 2024-05-09 DIAGNOSIS — G47.09 OTHER INSOMNIA: Chronic | ICD-10-CM

## 2024-05-09 DIAGNOSIS — F90.0 ADHD (ATTENTION DEFICIT HYPERACTIVITY DISORDER), INATTENTIVE TYPE: Primary | Chronic | ICD-10-CM

## 2024-05-09 PROCEDURE — 1160F RVW MEDS BY RX/DR IN RCRD: CPT | Performed by: NURSE PRACTITIONER

## 2024-05-09 PROCEDURE — 1159F MED LIST DOCD IN RCRD: CPT | Performed by: NURSE PRACTITIONER

## 2024-05-09 PROCEDURE — 99214 OFFICE O/P EST MOD 30 MIN: CPT | Performed by: NURSE PRACTITIONER

## 2024-05-09 RX ORDER — ESCITALOPRAM OXALATE 20 MG/1
20 TABLET ORAL DAILY
Qty: 30 TABLET | Refills: 2 | Status: SHIPPED | OUTPATIENT
Start: 2024-05-09 | End: 2025-05-09

## 2024-05-09 RX ORDER — DEXTROAMPHETAMINE SACCHARATE, AMPHETAMINE ASPARTATE MONOHYDRATE, DEXTROAMPHETAMINE SULFATE AND AMPHETAMINE SULFATE 3.75; 3.75; 3.75; 3.75 MG/1; MG/1; MG/1; MG/1
15 CAPSULE, EXTENDED RELEASE ORAL EVERY MORNING
Qty: 30 CAPSULE | Refills: 0 | Status: SHIPPED | OUTPATIENT
Start: 2024-05-09

## 2024-05-09 RX ORDER — TRAZODONE HYDROCHLORIDE 100 MG/1
100 TABLET ORAL NIGHTLY
Qty: 30 TABLET | Refills: 2 | Status: SHIPPED | OUTPATIENT
Start: 2024-05-09

## 2024-05-16 LAB — DRUGS UR: NORMAL

## 2024-06-11 DIAGNOSIS — F90.0 ADHD (ATTENTION DEFICIT HYPERACTIVITY DISORDER), INATTENTIVE TYPE: Chronic | ICD-10-CM

## 2024-06-12 RX ORDER — DEXTROAMPHETAMINE SACCHARATE, AMPHETAMINE ASPARTATE MONOHYDRATE, DEXTROAMPHETAMINE SULFATE AND AMPHETAMINE SULFATE 3.75; 3.75; 3.75; 3.75 MG/1; MG/1; MG/1; MG/1
15 CAPSULE, EXTENDED RELEASE ORAL EVERY MORNING
Qty: 30 CAPSULE | Refills: 0 | Status: SHIPPED | OUTPATIENT
Start: 2024-06-12

## 2024-07-19 DIAGNOSIS — F90.0 ADHD (ATTENTION DEFICIT HYPERACTIVITY DISORDER), INATTENTIVE TYPE: Chronic | ICD-10-CM

## 2024-07-22 RX ORDER — DEXTROAMPHETAMINE SACCHARATE, AMPHETAMINE ASPARTATE MONOHYDRATE, DEXTROAMPHETAMINE SULFATE AND AMPHETAMINE SULFATE 3.75; 3.75; 3.75; 3.75 MG/1; MG/1; MG/1; MG/1
15 CAPSULE, EXTENDED RELEASE ORAL EVERY MORNING
Qty: 30 CAPSULE | Refills: 0 | Status: SHIPPED | OUTPATIENT
Start: 2024-07-22

## 2024-07-29 NOTE — PROGRESS NOTES
"Chief Complaint  Depression, anxiety, insomnia, and ADHD, inattentive type      Subjective          Jeanne Jack presents to [facility] by herself for a follow up and medication check.    History of Present Illness: Jeanne states, \"I am okay, but I am getting sadness out of no here.\" Jeanne tells me she feels sad mid-day everyday recently. The feeling lasts for minutes to hours, and then resolves. She is taking medication as prescribed, but finds it is hard to take the Propranolol in the afternoon. The Adderall XR has been helpful for her focus, concentration, and attention. She is not sleeping due to her daughter not sleeping well, but she takes the Trazodone every night. She denies any medical changes or problems with appetite. She is taking Adderall XR, Propranolol, Trazodone, and Lexapro. She denies any side effects. She denies any SI/HI/AVH.    Social History: Jeanne currently lives in [place], [place] with her fiancé and 5 children.  She has returned to work after having her last child.  She is engaged to be .  She is not in college courses at this time.  She enjoys spending time with her family and watching TV.  She occasionally uses cannabis but denies the use of any nicotine, alcohol, or recreational drugs.    Current Medications:   Current Outpatient Medications   Medication Sig Dispense Refill    amphetamine-dextroamphetamine XR (ADDERALL XR) 15 MG 24 hr capsule Take 1 capsule by mouth Every Morning 30 capsule 0    escitalopram (Lexapro) 20 MG tablet Take 1 tablet by mouth Daily. 30 tablet 2     MG tablet       propranolol (INDERAL) 10 MG tablet Take 1 tablet by mouth 3 (Three) Times a Day As Needed (anxiety). 90 tablet 2    traZODone (DESYREL) 100 MG tablet Take 1 tablet by mouth Every Night. 30 tablet 2     No current facility-administered medications for this visit.         Objective   Vital Signs:   BP 94/72   Pulse 88   Ht 157.5 cm " "(62\")   Wt 110 kg (242 lb)   SpO2 98%   BMI 44.26 kg/m²     Physical Exam  Vitals and nursing note reviewed.   Constitutional:       Appearance: Normal appearance. She is well-developed. She is obese.   Neurological:      General: No focal deficit present.      Mental Status: She is alert and oriented to person, place, and time.   Psychiatric:         Attention and Perception: Attention normal.         Mood and Affect: Mood and affect normal. Tearful: restricted to hold back tears.         Speech: Speech normal.         Behavior: Behavior normal. Behavior is cooperative.         Thought Content: Thought content normal.         Cognition and Memory: Cognition normal.         Judgment: Judgment normal.     The following data was reviewed by: ABILIO Dixon on 07/31/2024:      with Fernanda Longoria APRN (07/01/2024)     Assessment and Plan    Problem List Items Addressed This Visit    None  Visit Diagnoses       ADHD (attention deficit hyperactivity disorder), inattentive type  (Chronic)   -  Primary    Relevant Medications    escitalopram (Lexapro) 20 MG tablet    ERA (generalized anxiety disorder)  (Chronic)       Relevant Medications    escitalopram (Lexapro) 20 MG tablet    Current moderate episode of major depressive disorder, unspecified whether recurrent  (Chronic)       Relevant Medications    escitalopram (Lexapro) 20 MG tablet    Other insomnia  (Chronic)       Social anxiety disorder  (Chronic)       Relevant Medications    escitalopram (Lexapro) 20 MG tablet              Mental Status Exam:   Hygiene:   good  Cooperation:  Cooperative  Eye Contact:  Good  Psychomotor Behavior:  Appropriate  Affect:  Appropriate  Mood: normal  Speech:  Normal  Thought Process:  Goal directed and Linear  Thought Content:  Normal  Suicidal:  None  Homicidal:  None  Hallucinations:  None  Delusion:  None  Memory:  Intact  Orientation:  Person, Place, Time and Situation  Reliability:  good  Insight:  " Good  Judgement:  Good  Impulse Control:  Good  Physical/Medical Issues:  Yes Obesity per BMI        PHQ-9 Score:   PHQ-9 Total Score: 10     Impression/Plan:  -This is a follow up and medication check.  Jeanne reports feeling sad mid-day out of the blue. It resolves and she feels better, but she is concerned of the symptom. She has been taking all medications except Propranolol in the mid-day since it is hard to remember. We discussed that Propranolol can cause depression or it may be the effect of not having it in her system in the afternoon. She is going to commit to taking three times a day this week and monitor mood. If it worsens, we will stop Propranolol and consider an increase in Lexapro and change to Clonidine for anxiety. If it improves, we will change the IR to LA for a steady-state. She agrees. I set a reminder to check in a week.   -Continue Adderall XR 15 mg daily for ADHD. Patient has refills.   -Continue Lexapro 20 mg nightly around supper for depression and anxiety.   -Continue trazodone 100 mg nightly for insomnia. Patient has refills.   -Continue propranolol 10 mg 3 times daily as needed for anxiety. Patient has refills.   -The SILVANA report, reviewed through PDMP, of the past 12 months were reviewed and is appropriate.  The patient/guardian reports taking the medication only as prescribed.  The patient/guardian denies any abuse or misuse of the medication.  The patient/guardian denies any other substance use or issues.  There are no apparent substance related issues.  The patient reports no side effects of the current medication usage.  The patient/guardian has reported significant improvement with medication usage and wishes to continue medication as prescribed.  The patient/guardian is appropriate to continue with current medication usage at this time.  Reinforced risks and side effects of medication usage, patient and/or guardian verbalize understanding in their own words and are in agreement  with current plan.  -Last UDS on 05/09/24. Appropriate.          MEDS ORDERED DURING VISIT:  New Medications Ordered This Visit   Medications    escitalopram (Lexapro) 20 MG tablet     Sig: Take 1 tablet by mouth Daily.     Dispense:  30 tablet     Refill:  2       Follow Up   Return in about 2 months (around 9/30/2024) for Medication Check.  Patient was given instructions and counseling regarding her condition or for health maintenance advice. Please see specific information pulled into the AVS if appropriate.       TREATMENT PLAN/GOALS: Continue supportive psychotherapy efforts and medications as indicated. Treatment and medication options discussed during today's visit. Patient acknowledged and verbally consented to continue with current treatment plan and was educated on the importance of compliance with treatment and follow-up appointments.    MEDICATION ISSUES:  Discussed medication options and treatment plan of prescribed medication as well as the risks, benefits, and side effects including potential falls, possible impaired driving and metabolic adversities among others. Patient is agreeable to call the office with any worsening of symptoms or onset of side effects. Patient is agreeable to call 911 or go to the nearest ER should he/she begin having SI/HI.        This document has been electronically signed by ABILIO Ocasio, PMHNP-BC  July 31, 2024 09:03 EDT    Part of this note may be an electronic transcription/translation of spoken language to printed text using the Dragon Dictation System.

## 2024-07-31 ENCOUNTER — OFFICE VISIT (OUTPATIENT)
Dept: PSYCHIATRY | Facility: CLINIC | Age: 34
End: 2024-07-31
Payer: COMMERCIAL

## 2024-07-31 VITALS
SYSTOLIC BLOOD PRESSURE: 94 MMHG | OXYGEN SATURATION: 98 % | DIASTOLIC BLOOD PRESSURE: 72 MMHG | WEIGHT: 242 LBS | BODY MASS INDEX: 44.53 KG/M2 | HEART RATE: 88 BPM | HEIGHT: 62 IN

## 2024-07-31 DIAGNOSIS — F41.1 GAD (GENERALIZED ANXIETY DISORDER): Chronic | ICD-10-CM

## 2024-07-31 DIAGNOSIS — F32.1 CURRENT MODERATE EPISODE OF MAJOR DEPRESSIVE DISORDER, UNSPECIFIED WHETHER RECURRENT: Chronic | ICD-10-CM

## 2024-07-31 DIAGNOSIS — F90.0 ADHD (ATTENTION DEFICIT HYPERACTIVITY DISORDER), INATTENTIVE TYPE: Primary | Chronic | ICD-10-CM

## 2024-07-31 DIAGNOSIS — G47.09 OTHER INSOMNIA: Chronic | ICD-10-CM

## 2024-07-31 DIAGNOSIS — F40.10 SOCIAL ANXIETY DISORDER: Chronic | ICD-10-CM

## 2024-07-31 PROCEDURE — 99214 OFFICE O/P EST MOD 30 MIN: CPT | Performed by: NURSE PRACTITIONER

## 2024-07-31 RX ORDER — ESCITALOPRAM OXALATE 20 MG/1
20 TABLET ORAL DAILY
Qty: 30 TABLET | Refills: 2 | Status: SHIPPED | OUTPATIENT
Start: 2024-07-31 | End: 2025-07-31

## 2024-08-07 ENCOUNTER — TELEPHONE (OUTPATIENT)
Dept: PSYCHIATRY | Facility: CLINIC | Age: 34
End: 2024-08-07
Payer: COMMERCIAL

## 2024-08-07 DIAGNOSIS — F41.1 GAD (GENERALIZED ANXIETY DISORDER): Primary | ICD-10-CM

## 2024-08-07 RX ORDER — PROPRANOLOL HCL 60 MG
60 CAPSULE, EXTENDED RELEASE 24HR ORAL DAILY
Qty: 30 CAPSULE | Refills: 11 | Status: SHIPPED | OUTPATIENT
Start: 2024-08-07 | End: 2025-08-07

## 2024-08-07 NOTE — TELEPHONE ENCOUNTER
Called Patient she said that she feels like when she takes Propranolol 3 times it does good thinks she like to do the once day LA. Please advise

## 2024-08-14 DIAGNOSIS — F41.1 GAD (GENERALIZED ANXIETY DISORDER): Chronic | ICD-10-CM

## 2024-08-14 DIAGNOSIS — F32.1 CURRENT MODERATE EPISODE OF MAJOR DEPRESSIVE DISORDER, UNSPECIFIED WHETHER RECURRENT: Chronic | ICD-10-CM

## 2024-08-14 RX ORDER — ESCITALOPRAM OXALATE 20 MG/1
20 TABLET ORAL DAILY
Qty: 30 TABLET | Refills: 2 | OUTPATIENT
Start: 2024-08-14 | End: 2025-08-14

## 2024-08-30 DIAGNOSIS — F90.0 ADHD (ATTENTION DEFICIT HYPERACTIVITY DISORDER), INATTENTIVE TYPE: Chronic | ICD-10-CM

## 2024-09-02 RX ORDER — DEXTROAMPHETAMINE SACCHARATE, AMPHETAMINE ASPARTATE MONOHYDRATE, DEXTROAMPHETAMINE SULFATE AND AMPHETAMINE SULFATE 3.75; 3.75; 3.75; 3.75 MG/1; MG/1; MG/1; MG/1
15 CAPSULE, EXTENDED RELEASE ORAL EVERY MORNING
Qty: 30 CAPSULE | Refills: 0 | Status: SHIPPED | OUTPATIENT
Start: 2024-09-02

## 2024-09-30 NOTE — PROGRESS NOTES
"Chief Complaint  Depression, anxiety, social anxiety, insomnia, and ADHD, inattentive type      Subjective          Jeanne Jack presents to [facility] by herself for a follow up and medication check.    History of Present Illness: Jeanne states, \"I am okay.\"  Jeanne tells me that she stopped her Adderall.  She was feeling more anxious and jittery at work.  She finds that her anxiety has been more severe, especially around people.  She is anxious throughout the day.  There will be times she feels heart palpitations or near panic.  She believes the propranolol LA has been better overall, but felt the combination with Adderall was too much.  She rates anxiety a 7 out of 10 with 10 being the most severe.  She is finding that her depression is improving.  Since stopping Adderall, she has noticed a cloudiness in her focus, but does not find it is interfering with work and she is not in school anymore.  Sleep continues to be disrupted at times due to her infant daughter.  She is 17 months and has still rarely sleeps through the night.  She also sleeps in the [facility] bedroom.  She reports that she never feels rested, even with a full night sleep.  She uses trazodone every night, but still has difficulty falling asleep more than staying asleep.  She denies any medical changes or problems with appetite.  She reports compliance with her psychiatric medication. She is taking Adderall XR, Propranolol, Trazodone, and Lexapro. She denies any side effects. She denies any SI/HI/AVH.    Social History: Jeanne currently lives in [place], [place] with her fiancé and 5 children.  She has returned to work after having her last child.  She is engaged to be .  She is not in college courses at this time.  She enjoys spending time with her family and watching TV.  She occasionally uses cannabis but denies the use of any nicotine, alcohol, or recreational drugs.    Current Medications: " "  Current Outpatient Medications   Medication Sig Dispense Refill    escitalopram (Lexapro) 20 MG tablet Take 1.5 tablets by mouth Daily. 45 tablet 2     MG tablet       propranolol LA (Inderal LA) 80 MG 24 hr capsule Take 1 capsule by mouth Daily. 30 capsule 2    traZODone (DESYREL) 100 MG tablet Take 1 tablet by mouth Every Night. 30 tablet 2     No current facility-administered medications for this visit.         Objective   Vital Signs:   /76   Pulse 82   Ht 157.5 cm (62\")   Wt 115 kg (253 lb)   SpO2 98%   BMI 46.27 kg/m²     Physical Exam  Vitals and nursing note reviewed.   Constitutional:       Appearance: Normal appearance. She is well-developed. She is obese.   Neurological:      General: No focal deficit present.      Mental Status: She is alert and oriented to person, place, and time.   Psychiatric:         Attention and Perception: Attention normal.         Mood and Affect: Affect normal. Mood is anxious.         Speech: Speech normal.         Behavior: Behavior normal. Behavior is cooperative.         Thought Content: Thought content normal.         Cognition and Memory: Cognition normal.         Judgment: Judgment normal.     The following data was reviewed by: ABILIO Dixon on 10/03/2024:         Assessment and Plan    Problem List Items Addressed This Visit    None  Visit Diagnoses       ERA (generalized anxiety disorder)  (Chronic)   -  Primary    Relevant Medications    traZODone (DESYREL) 100 MG tablet    propranolol LA (Inderal LA) 80 MG 24 hr capsule    escitalopram (Lexapro) 20 MG tablet    ADHD (attention deficit hyperactivity disorder), inattentive type  (Chronic)       Relevant Medications    traZODone (DESYREL) 100 MG tablet    escitalopram (Lexapro) 20 MG tablet    Other insomnia  (Chronic)       Relevant Medications    traZODone (DESYREL) 100 MG tablet    Social anxiety disorder  (Chronic)       Relevant Medications    traZODone (DESYREL) 100 MG tablet "    escitalopram (Lexapro) 20 MG tablet    Current moderate episode of major depressive disorder, unspecified whether recurrent  (Chronic)       Relevant Medications    traZODone (DESYREL) 100 MG tablet    escitalopram (Lexapro) 20 MG tablet                Mental Status Exam:   Hygiene:   good  Cooperation:  Cooperative  Eye Contact:  Good  Psychomotor Behavior:  Appropriate  Affect:  Appropriate  Mood: anxious  Speech:  Normal  Thought Process:  Goal directed and Linear  Thought Content:  Normal  Suicidal:  None  Homicidal:  None  Hallucinations:  None  Delusion:  None  Memory:  Intact  Orientation:  Person, Place, Time and Situation  Reliability:  good  Insight:  Good  Judgement:  Good  Impulse Control:  Good  Physical/Medical Issues:  Yes Obesity per BMI, hearing loss in right ear, and  history of bilateral carpal tunnel syndrome       PHQ-9 Score:   PHQ-9 Total Score: 12     Impression/Plan:  -This is a follow up and medication check.  Jeanne reports an increase in anxiety.  This has been worse at work around others.  It is constant during the day and she will occasionally feel physical effects or near panic.  The propranolol LA is helping, but she elected to stop Adderall due to the increase in jitteriness and anxiety.  She has not seen major changes yet and rates her anxiety 7 out of 10 with 10 being the highest most days.  She continues to struggle with falling asleep despite taking trazodone.  She finds her mood is improving.  We discussed possible medication changes including staying off the stimulant until anxiety is well-managed.  We discussed increasing propranolol LA and Lexapro since both medications have been helpful.  She agrees to try this option.  -Hold Adderall XR 15 mg daily for ADHD. Patient has refills.   -Increase Lexapro to 30 mg nightly around supper for depression and anxiety.   -Continue trazodone 100 mg nightly for insomnia. Patient has refills.   -Increase propranolol LA to 80 mg daily  for anxiety.   -The SILVANA report, reviewed through PDMP, of the past 12 months were reviewed and is appropriate.  The patient/guardian reports taking the medication only as prescribed.  The patient/guardian denies any abuse or misuse of the medication.  The patient/guardian denies any other substance use or issues.  There are no apparent substance related issues.  The patient reports no side effects of the current medication usage.  The patient/guardian has reported significant improvement with medication usage and wishes to continue medication as prescribed.  The patient/guardian is appropriate to continue with current medication usage at this time.  Reinforced risks and side effects of medication usage, patient and/or guardian verbalize understanding in their own words and are in agreement with current plan.  -Last UDS on 05/09/24. Appropriate.  -I personally reviewed the primary care note of ABILIO Mcnair on 08/23/24.         MEDS ORDERED DURING VISIT:  New Medications Ordered This Visit   Medications    traZODone (DESYREL) 100 MG tablet     Sig: Take 1 tablet by mouth Every Night.     Dispense:  30 tablet     Refill:  2    propranolol LA (Inderal LA) 80 MG 24 hr capsule     Sig: Take 1 capsule by mouth Daily.     Dispense:  30 capsule     Refill:  2    escitalopram (Lexapro) 20 MG tablet     Sig: Take 1.5 tablets by mouth Daily.     Dispense:  45 tablet     Refill:  2       Follow Up   Return in about 2 months (around 12/3/2024) for Medication Check.  Patient was given instructions and counseling regarding her condition or for health maintenance advice. Please see specific information pulled into the AVS if appropriate.       TREATMENT PLAN/GOALS: Continue supportive psychotherapy efforts and medications as indicated. Treatment and medication options discussed during today's visit. Patient acknowledged and verbally consented to continue with current treatment plan and was educated on the importance of  compliance with treatment and follow-up appointments.    MEDICATION ISSUES:  Discussed medication options and treatment plan of prescribed medication as well as the risks, benefits, and side effects including potential falls, possible impaired driving and metabolic adversities among others. Patient is agreeable to call the office with any worsening of symptoms or onset of side effects. Patient is agreeable to call 911 or go to the nearest ER should he/she begin having SI/HI.        This document has been electronically signed by ABILIO Ocasio, PMHNP-BC  October 3, 2024 11:02 EDT    Part of this note may be an electronic transcription/translation of spoken language to printed text using the Dragon Dictation System.

## 2024-10-03 ENCOUNTER — OFFICE VISIT (OUTPATIENT)
Dept: PSYCHIATRY | Facility: CLINIC | Age: 34
End: 2024-10-03
Payer: COMMERCIAL

## 2024-10-03 VITALS
HEIGHT: 62 IN | SYSTOLIC BLOOD PRESSURE: 106 MMHG | DIASTOLIC BLOOD PRESSURE: 76 MMHG | OXYGEN SATURATION: 98 % | BODY MASS INDEX: 46.56 KG/M2 | HEART RATE: 82 BPM | WEIGHT: 253 LBS

## 2024-10-03 DIAGNOSIS — F41.1 GAD (GENERALIZED ANXIETY DISORDER): Primary | Chronic | ICD-10-CM

## 2024-10-03 DIAGNOSIS — F90.0 ADHD (ATTENTION DEFICIT HYPERACTIVITY DISORDER), INATTENTIVE TYPE: Chronic | ICD-10-CM

## 2024-10-03 DIAGNOSIS — G47.09 OTHER INSOMNIA: Chronic | ICD-10-CM

## 2024-10-03 DIAGNOSIS — F32.1 CURRENT MODERATE EPISODE OF MAJOR DEPRESSIVE DISORDER, UNSPECIFIED WHETHER RECURRENT: Chronic | ICD-10-CM

## 2024-10-03 DIAGNOSIS — F40.10 SOCIAL ANXIETY DISORDER: Chronic | ICD-10-CM

## 2024-10-03 PROCEDURE — 1160F RVW MEDS BY RX/DR IN RCRD: CPT | Performed by: NURSE PRACTITIONER

## 2024-10-03 PROCEDURE — 1159F MED LIST DOCD IN RCRD: CPT | Performed by: NURSE PRACTITIONER

## 2024-10-03 PROCEDURE — 99214 OFFICE O/P EST MOD 30 MIN: CPT | Performed by: NURSE PRACTITIONER

## 2024-10-03 RX ORDER — TRAZODONE HYDROCHLORIDE 100 MG/1
100 TABLET ORAL NIGHTLY
Qty: 30 TABLET | Refills: 2 | Status: SHIPPED | OUTPATIENT
Start: 2024-10-03

## 2024-10-03 RX ORDER — PROPRANOLOL HYDROCHLORIDE 80 MG/1
80 CAPSULE, EXTENDED RELEASE ORAL DAILY
Qty: 30 CAPSULE | Refills: 2 | Status: SHIPPED | OUTPATIENT
Start: 2024-10-03 | End: 2025-10-03

## 2024-10-03 RX ORDER — ESCITALOPRAM OXALATE 20 MG/1
30 TABLET ORAL DAILY
Qty: 45 TABLET | Refills: 2 | Status: SHIPPED | OUTPATIENT
Start: 2024-10-03 | End: 2025-10-03

## 2024-11-22 DIAGNOSIS — F41.1 GAD (GENERALIZED ANXIETY DISORDER): Chronic | ICD-10-CM

## 2024-11-22 DIAGNOSIS — F32.1 CURRENT MODERATE EPISODE OF MAJOR DEPRESSIVE DISORDER, UNSPECIFIED WHETHER RECURRENT: Chronic | ICD-10-CM

## 2024-11-22 RX ORDER — ESCITALOPRAM OXALATE 20 MG/1
20 TABLET ORAL DAILY
Qty: 90 TABLET | Refills: 0 | Status: SHIPPED | OUTPATIENT
Start: 2024-11-22

## 2024-11-25 NOTE — PROGRESS NOTES
"Chief Complaint  Depression, anxiety, social anxiety, insomnia, and ADHD, inattentive type      Subjective          Jeanne Jack presents to [facility] by herself for a follow up and medication check.    History of Present Illness: Jeanne states, \"I been okay.\"  Jeanne tells me that her anxiety has been much better since starting propranolol LA.  However, she feels as if depression has worsened.  She found Lexapro to be helpful, so she is interested in increasing the dose.  She finds that she lacks motivation and has a difficult time completing task on the weekend.  This leads to guilt feelings.  She is also struggling to wake early enough in the morning to be on time for work, so she has been late a few times.  She is taking the trazodone nightly around 10-10 30.  She is in bed by 11 PM, but does not feel tired from the medication.  She also continues to wake through the night for no reason.  This leads to difficulty waking in the morning on time and constant exhaustion throughout the day.  She denies any medical changes or problems with appetite. She reports compliance with her psychiatric medication. She is taking Propranolol LA, Trazodone, and Lexapro. She denies any side effects. She denies any SI/HI/AVH.    Social History: Jeanne currently lives in [place], [place] with her fiancé and 5 children.  She has returned to work after having her last child.  She is engaged to be .  She is not in college courses at this time.  She enjoys spending time with her family and watching TV.  She occasionally uses cannabis but denies the use of any nicotine, alcohol, or recreational drugs.    Current Medications:   Current Outpatient Medications   Medication Sig Dispense Refill    escitalopram (LEXAPRO) 20 MG tablet Take 1.5 tablets by mouth Daily. 135 tablet 1    propranolol LA (Inderal LA) 80 MG 24 hr capsule Take 1 capsule by mouth Daily. 30 capsule 2    traZODone " "(DESYREL) 100 MG tablet Take 1 tablet by mouth Every Night. 30 tablet 2     No current facility-administered medications for this visit.         Objective   Vital Signs:   /74   Pulse 92   Ht 157.5 cm (62\")   Wt 116 kg (255 lb)   SpO2 98%   BMI 46.64 kg/m²     Physical Exam  Vitals and nursing note reviewed.   Constitutional:       Appearance: Normal appearance. She is well-developed. She is obese.   Neurological:      General: No focal deficit present.      Mental Status: She is alert and oriented to person, place, and time.   Psychiatric:         Attention and Perception: Attention normal.         Mood and Affect: Affect normal. Mood is depressed.         Speech: Speech normal.         Behavior: Behavior normal. Behavior is cooperative.         Thought Content: Thought content normal.         Cognition and Memory: Cognition normal.         Judgment: Judgment normal.     The following data was reviewed by: ABILIO Dixon on 12/04/2024:         Assessment and Plan    Problem List Items Addressed This Visit    None  Visit Diagnoses       Current moderate episode of major depressive disorder, unspecified whether recurrent  (Chronic)   -  Primary    Relevant Medications    escitalopram (LEXAPRO) 20 MG tablet    ERA (generalized anxiety disorder)  (Chronic)       Relevant Medications    escitalopram (LEXAPRO) 20 MG tablet    ADHD (attention deficit hyperactivity disorder), inattentive type  (Chronic)       Relevant Medications    escitalopram (LEXAPRO) 20 MG tablet    Other insomnia  (Chronic)       Social anxiety disorder  (Chronic)       Relevant Medications    escitalopram (LEXAPRO) 20 MG tablet                  Mental Status Exam:   Hygiene:   good  Cooperation:  Cooperative  Eye Contact:  Good  Psychomotor Behavior:  Appropriate  Affect:  Restricted  Mood: depressed  Speech:  Normal  Thought Process:  Goal directed and Linear  Thought Content:  Normal  Suicidal:  None  Homicidal:  " None  Hallucinations:  None  Delusion:  None  Memory:  Intact  Orientation:  Person, Place, Time and Situation  Reliability:  good  Insight:  Good  Judgement:  Good  Impulse Control:  Good  Physical/Medical Issues:  Yes Obesity per BMI, hearing loss in right ear, and  history of bilateral carpal tunnel syndrome       PHQ-9 Score:   PHQ-9 Total Score:       Impression/Plan:  -This is a follow up and medication check.  Jeanne reports improved symptoms of anxiety.  However, she is having an exacerbation of depression.  She is motivated to increase Lexapro.  We had previously discussed increasing to 30 mg, but she is still taking 20.  Therefore, we will increase the medication to 30 mg.  We also discussed treating ADHD versus adjusting medication for sleep.  She feels her ADHD symptoms are managed and is hesitant to restart therapy as this may impair sleep further.  I explained the concern for further increasing serotonin with higher levels of Lexapro and trazodone.  We discussed an alternative, but she is uncomfortable with the adverse effects of weight gain with most hypnotics.  Therefore, we discussed using a supplement called super calm which includes Ashwaganda, magnesium glycinate, L-theanine, and vitamin D3.  She is going to try this supplement for sleep and take about 6 to 7 PM.  She is also going to try taking her Lexapro earlier in the evening to see if this helps improve her grogginess in the early morning.  -Increase Lexapro to 30 mg nightly around supper for depression and anxiety.   -Continue trazodone 100 mg nightly for insomnia. Patient has refills.   -Continue propranolol LA 80 mg daily for anxiety.  Patient has refills.  -The SILVANA report, reviewed through PDMP, of the past 12 months were reviewed and is appropriate.  The patient/guardian reports taking the medication only as prescribed.  The patient/guardian denies any abuse or misuse of the medication.  The patient/guardian denies any other  substance use or issues.  There are no apparent substance related issues.  The patient reports no side effects of the current medication usage.  The patient/guardian has reported significant improvement with medication usage and wishes to continue medication as prescribed.  The patient/guardian is appropriate to continue with current medication usage at this time.  Reinforced risks and side effects of medication usage, patient and/or guardian verbalize understanding in their own words and are in agreement with current plan.  -Last UDS on 05/09/24. Appropriate.  -I personally reviewed the primary care note of ABILIO Mcnair on 08/23/24.         MEDS ORDERED DURING VISIT:  New Medications Ordered This Visit   Medications    escitalopram (LEXAPRO) 20 MG tablet     Sig: Take 1.5 tablets by mouth Daily.     Dispense:  135 tablet     Refill:  1       Follow Up   Return in about 2 months (around 2/4/2025) for Medication Check.  Patient was given instructions and counseling regarding her condition or for health maintenance advice. Please see specific information pulled into the AVS if appropriate.       TREATMENT PLAN/GOALS: Continue supportive psychotherapy efforts and medications as indicated. Treatment and medication options discussed during today's visit. Patient acknowledged and verbally consented to continue with current treatment plan and was educated on the importance of compliance with treatment and follow-up appointments.    MEDICATION ISSUES:  Discussed medication options and treatment plan of prescribed medication as well as the risks, benefits, and side effects including potential falls, possible impaired driving and metabolic adversities among others. Patient is agreeable to call the office with any worsening of symptoms or onset of side effects. Patient is agreeable to call 911 or go to the nearest ER should he/she begin having SI/HI.        This document has been electronically signed by Kimmy FRANCIS  ABILIO Cloud, PMHNP-BC  December 4, 2024 08:48 EST    Part of this note may be an electronic transcription/translation of spoken language to printed text using the Dragon Dictation System.

## 2024-12-04 ENCOUNTER — OFFICE VISIT (OUTPATIENT)
Dept: PSYCHIATRY | Facility: CLINIC | Age: 34
End: 2024-12-04
Payer: COMMERCIAL

## 2024-12-04 VITALS
SYSTOLIC BLOOD PRESSURE: 108 MMHG | HEART RATE: 92 BPM | HEIGHT: 62 IN | WEIGHT: 255 LBS | DIASTOLIC BLOOD PRESSURE: 74 MMHG | BODY MASS INDEX: 46.93 KG/M2 | OXYGEN SATURATION: 98 %

## 2024-12-04 DIAGNOSIS — G47.09 OTHER INSOMNIA: Chronic | ICD-10-CM

## 2024-12-04 DIAGNOSIS — F32.1 CURRENT MODERATE EPISODE OF MAJOR DEPRESSIVE DISORDER, UNSPECIFIED WHETHER RECURRENT: Primary | Chronic | ICD-10-CM

## 2024-12-04 DIAGNOSIS — F41.1 GAD (GENERALIZED ANXIETY DISORDER): Chronic | ICD-10-CM

## 2024-12-04 DIAGNOSIS — F40.10 SOCIAL ANXIETY DISORDER: Chronic | ICD-10-CM

## 2024-12-04 DIAGNOSIS — F90.0 ADHD (ATTENTION DEFICIT HYPERACTIVITY DISORDER), INATTENTIVE TYPE: Chronic | ICD-10-CM

## 2024-12-04 PROCEDURE — 99214 OFFICE O/P EST MOD 30 MIN: CPT | Performed by: NURSE PRACTITIONER

## 2024-12-04 PROCEDURE — 1160F RVW MEDS BY RX/DR IN RCRD: CPT | Performed by: NURSE PRACTITIONER

## 2024-12-04 PROCEDURE — 1159F MED LIST DOCD IN RCRD: CPT | Performed by: NURSE PRACTITIONER

## 2024-12-04 RX ORDER — ESCITALOPRAM OXALATE 20 MG/1
30 TABLET ORAL DAILY
Qty: 135 TABLET | Refills: 1 | Status: SHIPPED | OUTPATIENT
Start: 2024-12-04

## 2024-12-12 ENCOUNTER — TELEPHONE (OUTPATIENT)
Dept: PSYCHIATRY | Facility: CLINIC | Age: 34
End: 2024-12-12
Payer: COMMERCIAL

## 2024-12-12 NOTE — TELEPHONE ENCOUNTER
Scooter with  ENT called regarding Jeanne. Cece was calling on behalf of Kasie KNOX, she would like to know if adding Nortriptyline to her regimen would this cause any concerns? Kasie asked for you to give her a call back at [phone].     I have scanned in the last office note from [facility]. Thank you.

## 2024-12-12 NOTE — TELEPHONE ENCOUNTER
She did not want to use a medication that may cause weight gain. Otherwise, no concerns at low doses.

## 2025-01-12 DIAGNOSIS — F41.1 GAD (GENERALIZED ANXIETY DISORDER): Chronic | ICD-10-CM

## 2025-01-13 RX ORDER — PROPRANOLOL HYDROCHLORIDE 80 MG/1
80 CAPSULE, EXTENDED RELEASE ORAL DAILY
Qty: 30 CAPSULE | Refills: 2 | Status: SHIPPED | OUTPATIENT
Start: 2025-01-13 | End: 2026-01-13

## 2025-02-03 DIAGNOSIS — G47.09 OTHER INSOMNIA: Chronic | ICD-10-CM

## 2025-02-03 RX ORDER — TRAZODONE HYDROCHLORIDE 100 MG/1
100 TABLET ORAL NIGHTLY
Qty: 30 TABLET | Refills: 2 | Status: SHIPPED | OUTPATIENT
Start: 2025-02-03

## 2025-02-10 NOTE — PROGRESS NOTES
"Mode of Visit: Video   Location of patient: -OTHER-: personal vehicle    Location of provider: [facility]   You have chosen to receive care through a telehealth visit.   The patient has signed the video visit consent form.   The visit included audio and video interaction. No technical issues occurred during this visit.     Chief Complaint  Depression, anxiety, social anxiety, insomnia, and ADHD, inattentive type      Subjective          Jeanne Jack presents via [employer] through [employer] by herself for a follow up and medication check.    History of Present Illness: Jeanne states, \"everyone has been sick.\"  Jeanne tells me that she and her children are recovering from an illness.  She has experienced interruption in sleep due to taking care of her children.  She finds this is affecting her energy level.  She had adjusted her psychiatric medication regiment to taking Propranolol LA in the daytime and Lexapro earlier in the evening and sleep is when able to take medications regularly and sleep without interruption. She finds her mood has been lower, but anxiety has significantly improved. She describes good focus, concentration, and attention. She states, \"work has been fine.\" Her appetite has been impaired with the illness as well.  She reports compliance with her psychiatric medication. She is taking Propranolol LA, Trazodone, and Lexapro. She denies any side effects. She denies any SI/HI/AVH.    Social History: Jeanne currently lives in [place], [place] with her fiancé and 5 children.  She has returned to work after having her last child.  She is engaged to be .  She is not in college courses at this time.  She enjoys spending time with her family and watching TV.  She occasionally uses cannabis but denies the use of any nicotine, alcohol, or recreational drugs.    Current Medications:   Current Outpatient Medications   Medication Sig Dispense Refill    benzonatate (TESSALON) 200 MG capsule       " escitalopram (LEXAPRO) 20 MG tablet Take 1.5 tablets by mouth Daily. 135 tablet 1    guaiFENesin (MUCINEX) 600 MG 12 hr tablet       ondansetron ODT (ZOFRAN-ODT) 4 MG disintegrating tablet Take 1 tablet by mouth Every 6 (Six) Hours As Needed for Nausea or Vomiting. 15 tablet 0    predniSONE 5 MG (21) tablet therapy pack dose pack       propranolol LA (INDERAL LA) 80 MG 24 hr capsule Take 1 capsule by mouth Daily. 90 capsule 1    traZODone (DESYREL) 100 MG tablet Take 1 tablet by mouth Every Night. 30 tablet 2    valACYclovir (VALTREX) 1000 MG tablet Take 1 tablet by mouth Daily.      buPROPion SR (Wellbutrin SR) 100 MG 12 hr tablet Take 1 tablet by mouth 2 (Two) Times a Day. 60 tablet 2     No current facility-administered medications for this visit.         Objective   Vital Signs:   There were no vitals taken for this visit.    Physical Exam  Nursing note reviewed. Vitals reviewed: Vitals not obtained due to nature of telehealth visit.  Constitutional:       Appearance: Normal appearance. She is well-developed. She is obese.   Neurological:      General: No focal deficit present.      Mental Status: She is alert and oriented to person, place, and time.   Psychiatric:         Attention and Perception: Attention normal.         Mood and Affect: Affect normal. Mood is depressed. Affect is blunt.         Speech: Speech normal.         Behavior: Behavior normal. Behavior is cooperative.         Thought Content: Thought content normal.         Cognition and Memory: Cognition normal.         Judgment: Judgment normal.     The following data was reviewed by: ABILIO Dixon on 02/12/2025:      with Debbie Britt APRN (01/07/2025)     Assessment and Plan    Problem List Items Addressed This Visit    None  Visit Diagnoses       Current moderate episode of major depressive disorder, unspecified whether recurrent  (Chronic)   -  Primary    Relevant Medications    buPROPion SR (Wellbutrin SR) 100 MG  12 hr tablet    ERA (generalized anxiety disorder)  (Chronic)       Relevant Medications    buPROPion SR (Wellbutrin SR) 100 MG 12 hr tablet    propranolol LA (INDERAL LA) 80 MG 24 hr capsule    Other insomnia  (Chronic)       ADHD (attention deficit hyperactivity disorder), inattentive type  (Chronic)       Relevant Medications    buPROPion SR (Wellbutrin SR) 100 MG 12 hr tablet    Social anxiety disorder  (Chronic)       Relevant Medications    buPROPion SR (Wellbutrin SR) 100 MG 12 hr tablet            Mental Status Exam:   Hygiene:   good  Cooperation:  Cooperative  Eye Contact:  Good  Psychomotor Behavior:  Appropriate  Affect:  Blunted  Mood: depressed  Speech:  Normal  Thought Process:  Goal directed and Linear  Thought Content:  Normal  Suicidal:  None  Homicidal:  None  Hallucinations:  None  Delusion:  None  Memory:  Intact  Orientation:  Person, Place, Time and Situation  Reliability:  good  Insight:  Good  Judgement:  Good  Impulse Control:  Good  Physical/Medical Issues:  Yes Obesity per BMI, hearing loss in right ear, and  history of bilateral carpal tunnel syndrome       PHQ-9 Score:   PHQ-9 Total Score: (Patient-Rptd) 6      Impression/Plan:  -This is a follow up and medication check.  Jeanne reports an exacerbation of depression.  A viral illness has interrupted sleep while caring for her children and for herself.  However, medication adjustments and a good sleep routine had improved sleep overall for her.  Her appetite has been affected as well.  She describes good focus, concentration, and attention at work and home.  Today, we discussed ways to improve her mood.  I explained that an increase in serotonin can often create a blunting affect, so creating more balance with a medication like Wellbutrin may be helpful.  I explained the mechanism of action, purpose, risk, benefits, and potential adverse effects.  She agrees to try Wellbutrin for benefits on depression.  -Initiate Wellbutrin  mg  daily for two weeks then may increase to 200 mg daily or 100 mg twice daily with second dose around 2-3 PM for depression.   -Continue Lexapro 30 mg nightly around supper for depression and anxiety. Patient has refills.  -Continue trazodone 100 mg nightly for insomnia. Patient has refills.   -Continue propranolol LA 80 mg daily for anxiety.    -The SILVANA report, reviewed through PDMP, of the past 12 months were reviewed and is appropriate.  The patient/guardian reports taking the medication only as prescribed.  The patient/guardian denies any abuse or misuse of the medication.  The patient/guardian denies any other substance use or issues.  There are no apparent substance related issues.  The patient reports no side effects of the current medication usage.  The patient/guardian has reported significant improvement with medication usage and wishes to continue medication as prescribed.  The patient/guardian is appropriate to continue with current medication usage at this time.  Reinforced risks and side effects of medication usage, patient and/or guardian verbalize understanding in their own words and are in agreement with current plan.  -Last UDS on 05/09/24. Appropriate.    MEDS ORDERED DURING VISIT:  New Medications Ordered This Visit   Medications    buPROPion SR (Wellbutrin SR) 100 MG 12 hr tablet     Sig: Take 1 tablet by mouth 2 (Two) Times a Day.     Dispense:  60 tablet     Refill:  2    propranolol LA (INDERAL LA) 80 MG 24 hr capsule     Sig: Take 1 capsule by mouth Daily.     Dispense:  90 capsule     Refill:  1       Follow Up   Return in about 4 weeks (around 3/12/2025) for Medication Check.  Patient was given instructions and counseling regarding her condition or for health maintenance advice. Please see specific information pulled into the AVS if appropriate.       TREATMENT PLAN/GOALS: Continue supportive psychotherapy efforts and medications as indicated. Treatment and medication options discussed  during today's visit. Patient acknowledged and verbally consented to continue with current treatment plan and was educated on the importance of compliance with treatment and follow-up appointments.    MEDICATION ISSUES:  Discussed medication options and treatment plan of prescribed medication as well as the risks, benefits, and side effects including potential falls, possible impaired driving and metabolic adversities among others. Patient is agreeable to call the office with any worsening of symptoms or onset of side effects. Patient is agreeable to call 911 or go to the nearest ER should he/she begin having SI/HI.        This document has been electronically signed by ABILIO Ocasio, PMHNP-BC  February 12, 2025 11:58 EST    Part of this note may be an electronic transcription/translation of spoken language to printed text using the Dragon Dictation System.

## 2025-02-12 ENCOUNTER — TELEMEDICINE (OUTPATIENT)
Dept: PSYCHIATRY | Facility: CLINIC | Age: 35
End: 2025-02-12
Payer: COMMERCIAL

## 2025-02-12 DIAGNOSIS — F90.0 ADHD (ATTENTION DEFICIT HYPERACTIVITY DISORDER), INATTENTIVE TYPE: Chronic | ICD-10-CM

## 2025-02-12 DIAGNOSIS — F32.1 CURRENT MODERATE EPISODE OF MAJOR DEPRESSIVE DISORDER, UNSPECIFIED WHETHER RECURRENT: Primary | Chronic | ICD-10-CM

## 2025-02-12 DIAGNOSIS — F40.10 SOCIAL ANXIETY DISORDER: Chronic | ICD-10-CM

## 2025-02-12 DIAGNOSIS — G47.09 OTHER INSOMNIA: Chronic | ICD-10-CM

## 2025-02-12 DIAGNOSIS — F41.1 GAD (GENERALIZED ANXIETY DISORDER): Chronic | ICD-10-CM

## 2025-02-12 PROCEDURE — 1160F RVW MEDS BY RX/DR IN RCRD: CPT | Performed by: NURSE PRACTITIONER

## 2025-02-12 PROCEDURE — 99214 OFFICE O/P EST MOD 30 MIN: CPT | Performed by: NURSE PRACTITIONER

## 2025-02-12 PROCEDURE — 1159F MED LIST DOCD IN RCRD: CPT | Performed by: NURSE PRACTITIONER

## 2025-02-12 RX ORDER — BUPROPION HYDROCHLORIDE 100 MG/1
100 TABLET, EXTENDED RELEASE ORAL 2 TIMES DAILY
Qty: 60 TABLET | Refills: 2 | Status: SHIPPED | OUTPATIENT
Start: 2025-02-12

## 2025-02-12 RX ORDER — PROPRANOLOL HYDROCHLORIDE 80 MG/1
80 CAPSULE, EXTENDED RELEASE ORAL DAILY
Qty: 90 CAPSULE | Refills: 1 | Status: SHIPPED | OUTPATIENT
Start: 2025-02-12 | End: 2026-02-12

## 2025-02-12 RX ORDER — VALACYCLOVIR HYDROCHLORIDE 1 G/1
1 TABLET, FILM COATED ORAL DAILY
COMMUNITY
Start: 2024-12-26

## 2025-02-12 RX ORDER — BENZONATATE 200 MG/1
CAPSULE ORAL
COMMUNITY
Start: 2025-02-02

## 2025-02-12 RX ORDER — GUAIFENESIN 600 MG/1
TABLET, EXTENDED RELEASE ORAL
COMMUNITY
Start: 2025-02-02

## 2025-02-12 RX ORDER — PREDNISONE 5 MG/1
TABLET ORAL
COMMUNITY
Start: 2025-02-02

## 2025-05-03 DIAGNOSIS — F32.1 CURRENT MODERATE EPISODE OF MAJOR DEPRESSIVE DISORDER, UNSPECIFIED WHETHER RECURRENT: Chronic | ICD-10-CM

## 2025-05-05 NOTE — TELEPHONE ENCOUNTER
Patient sent request for refill thru MyChart. Patient no showed last appointment provider to advise on refills and scheduling.

## 2025-05-06 RX ORDER — BUPROPION HYDROCHLORIDE 100 MG/1
100 TABLET, EXTENDED RELEASE ORAL 2 TIMES DAILY
Qty: 60 TABLET | Refills: 2 | Status: SHIPPED | OUTPATIENT
Start: 2025-05-06

## 2025-06-14 DIAGNOSIS — G47.09 OTHER INSOMNIA: Chronic | ICD-10-CM

## 2025-06-16 RX ORDER — TRAZODONE HYDROCHLORIDE 100 MG/1
100 TABLET ORAL NIGHTLY
Qty: 30 TABLET | Refills: 0 | Status: SHIPPED | OUTPATIENT
Start: 2025-06-16

## 2025-06-16 NOTE — TELEPHONE ENCOUNTER
Sent via interface. Pt needs to schedule appointment with new provider. No show last appointment.

## 2025-08-14 DIAGNOSIS — G47.09 OTHER INSOMNIA: Chronic | ICD-10-CM

## 2025-08-15 RX ORDER — TRAZODONE HYDROCHLORIDE 100 MG/1
100 TABLET ORAL NIGHTLY
Qty: 30 TABLET | Refills: 0 | Status: SHIPPED | OUTPATIENT
Start: 2025-08-15